# Patient Record
Sex: MALE | Race: BLACK OR AFRICAN AMERICAN | NOT HISPANIC OR LATINO | Employment: FULL TIME | URBAN - METROPOLITAN AREA
[De-identification: names, ages, dates, MRNs, and addresses within clinical notes are randomized per-mention and may not be internally consistent; named-entity substitution may affect disease eponyms.]

---

## 2020-07-28 ENCOUNTER — OFFICE VISIT (OUTPATIENT)
Dept: FAMILY MEDICINE | Facility: CLINIC | Age: 40
End: 2020-07-28
Payer: COMMERCIAL

## 2020-07-28 ENCOUNTER — LAB VISIT (OUTPATIENT)
Dept: LAB | Facility: HOSPITAL | Age: 40
End: 2020-07-28
Payer: COMMERCIAL

## 2020-07-28 VITALS
TEMPERATURE: 98 F | WEIGHT: 235.44 LBS | HEIGHT: 74 IN | HEART RATE: 77 BPM | SYSTOLIC BLOOD PRESSURE: 124 MMHG | RESPIRATION RATE: 16 BRPM | BODY MASS INDEX: 30.21 KG/M2 | DIASTOLIC BLOOD PRESSURE: 80 MMHG | OXYGEN SATURATION: 96 %

## 2020-07-28 DIAGNOSIS — R53.83 FATIGUE, UNSPECIFIED TYPE: Primary | ICD-10-CM

## 2020-07-28 DIAGNOSIS — R53.83 FATIGUE, UNSPECIFIED TYPE: ICD-10-CM

## 2020-07-28 LAB
ALBUMIN SERPL BCP-MCNC: 4.2 G/DL (ref 3.5–5.2)
ALP SERPL-CCNC: 112 U/L (ref 55–135)
ALT SERPL W/O P-5'-P-CCNC: 51 U/L (ref 10–44)
ANION GAP SERPL CALC-SCNC: 9 MMOL/L (ref 8–16)
AST SERPL-CCNC: 35 U/L (ref 10–40)
BASOPHILS # BLD AUTO: 0.01 K/UL (ref 0–0.2)
BASOPHILS NFR BLD: 0.4 % (ref 0–1.9)
BILIRUB SERPL-MCNC: 0.4 MG/DL (ref 0.1–1)
BUN SERPL-MCNC: 14 MG/DL (ref 6–20)
CALCIUM SERPL-MCNC: 9.5 MG/DL (ref 8.7–10.5)
CHLORIDE SERPL-SCNC: 106 MMOL/L (ref 95–110)
CO2 SERPL-SCNC: 26 MMOL/L (ref 23–29)
CREAT SERPL-MCNC: 1.2 MG/DL (ref 0.5–1.4)
DIFFERENTIAL METHOD: ABNORMAL
EOSINOPHIL # BLD AUTO: 0 K/UL (ref 0–0.5)
EOSINOPHIL NFR BLD: 0.4 % (ref 0–8)
ERYTHROCYTE [DISTWIDTH] IN BLOOD BY AUTOMATED COUNT: 13.2 % (ref 11.5–14.5)
EST. GFR  (AFRICAN AMERICAN): >60 ML/MIN/1.73 M^2
EST. GFR  (NON AFRICAN AMERICAN): >60 ML/MIN/1.73 M^2
GLUCOSE SERPL-MCNC: 93 MG/DL (ref 70–110)
HCT VFR BLD AUTO: 50.7 % (ref 40–54)
HGB BLD-MCNC: 16 G/DL (ref 14–18)
IMM GRANULOCYTES # BLD AUTO: 0.01 K/UL (ref 0–0.04)
IMM GRANULOCYTES NFR BLD AUTO: 0.4 % (ref 0–0.5)
LYMPHOCYTES # BLD AUTO: 1 K/UL (ref 1–4.8)
LYMPHOCYTES NFR BLD: 37.4 % (ref 18–48)
MCH RBC QN AUTO: 30.4 PG (ref 27–31)
MCHC RBC AUTO-ENTMCNC: 31.6 G/DL (ref 32–36)
MCV RBC AUTO: 96 FL (ref 82–98)
MONOCYTES # BLD AUTO: 0.3 K/UL (ref 0.3–1)
MONOCYTES NFR BLD: 10.4 % (ref 4–15)
NEUTROPHILS # BLD AUTO: 1.4 K/UL (ref 1.8–7.7)
NEUTROPHILS NFR BLD: 51 % (ref 38–73)
NRBC BLD-RTO: 0 /100 WBC
PLATELET # BLD AUTO: 232 K/UL (ref 150–350)
PMV BLD AUTO: 11.6 FL (ref 9.2–12.9)
POTASSIUM SERPL-SCNC: 4.1 MMOL/L (ref 3.5–5.1)
PROT SERPL-MCNC: 7.8 G/DL (ref 6–8.4)
RBC # BLD AUTO: 5.26 M/UL (ref 4.6–6.2)
SARS-COV-2 IGG SERPLBLD QL IA.RAPID: NEGATIVE
SODIUM SERPL-SCNC: 141 MMOL/L (ref 136–145)
TSH SERPL DL<=0.005 MIU/L-ACNC: 1.16 UIU/ML (ref 0.4–4)
WBC # BLD AUTO: 2.78 K/UL (ref 3.9–12.7)

## 2020-07-28 PROCEDURE — 80053 COMPREHEN METABOLIC PANEL: CPT

## 2020-07-28 PROCEDURE — 99999 PR PBB SHADOW E&M-NEW PATIENT-LVL III: CPT | Mod: PBBFAC,,, | Performed by: FAMILY MEDICINE

## 2020-07-28 PROCEDURE — 99203 OFFICE O/P NEW LOW 30 MIN: CPT | Mod: S$GLB,,, | Performed by: FAMILY MEDICINE

## 2020-07-28 PROCEDURE — 36415 COLL VENOUS BLD VENIPUNCTURE: CPT | Mod: PO

## 2020-07-28 PROCEDURE — 99203 PR OFFICE/OUTPT VISIT, NEW, LEVL III, 30-44 MIN: ICD-10-PCS | Mod: S$GLB,,, | Performed by: FAMILY MEDICINE

## 2020-07-28 PROCEDURE — 86769 SARS-COV-2 COVID-19 ANTIBODY: CPT

## 2020-07-28 PROCEDURE — 85025 COMPLETE CBC W/AUTO DIFF WBC: CPT

## 2020-07-28 PROCEDURE — 84443 ASSAY THYROID STIM HORMONE: CPT

## 2020-07-28 PROCEDURE — 99999 PR PBB SHADOW E&M-NEW PATIENT-LVL III: ICD-10-PCS | Mod: PBBFAC,,, | Performed by: FAMILY MEDICINE

## 2020-07-28 NOTE — PROGRESS NOTES
Foster Hickman    Chief Complaint   Patient presents with    Establish Care       History of Present Illness:   Mr. Hickman comes in today as a new patient to establish care with me.  He is concerned as has not been feeling the past 2 weeks he states on July 17, 2020 he experienced palpitations which he confirmed with his fitness tracker.  On July 18, 2020 he states he awakens with fever and body aches.  He states he evaluated [facility] and had rapid COVID-19 test and chest x-ray, both with negative results.  As he felt rapid COVID-19 testing was not as accurate, he states he was checked with none rapid COVID-19 test performed at [facility] testing site; he states his result was negative.  He also reports initially having decreased appetite.    He states his body aches ceased by July 25, 2020.  He states he no longer has fever.  He states his appetite is a little better.  He reports having rare chills and hot intolerance with fatigue.  He states he does not feel mentally as sharp.    Otherwise, he denies having shortness of breath, cough, wheezing; sinus or ocular symptoms; chest pain, leg swelling; abdominal pain, nausea, vomiting, diarrhea, constipation; cold intolerance; unusual urinary symptoms; back pain; headaches; anxiety, depression, homicidal or suicidal thoughts.    He denies tobacco, alcohol, or illicit drug use.  He states no other family members have similar symptoms.  He states he has not been to work since July 17, 2020 due to job-sanctioned mandatory 14-day quarantine (as tested for COVID-19) and is scheduled to return on July 29, 2020.    Overall, he states he now feels 70% better.      History reviewed. No pertinent past medical history.      No current outpatient medications on file.         Review of Systems   Constitutional: Positive for appetite change, chills and fatigue. Negative for fever.   HENT: Negative for congestion, postnasal drip, rhinorrhea, sinus  pressure, sinus pain, sneezing and sore throat.    Eyes: Negative for pain, discharge, redness and itching.   Respiratory: Negative for cough, shortness of breath and wheezing.    Cardiovascular: Negative for chest pain, palpitations and leg swelling.   Gastrointestinal: Negative for abdominal pain, constipation, diarrhea and nausea.   Endocrine: Positive for heat intolerance. Negative for cold intolerance.   Genitourinary: Negative for difficulty urinating.   Musculoskeletal: Positive for myalgias. Negative for back pain.   Neurological: Negative for headaches.   Psychiatric/Behavioral: Negative for dysphoric mood and suicidal ideas. The patient is not nervous/anxious.         Negative for homicidal ideas.       Objective:  Physical Exam  Vitals signs reviewed.   Constitutional:       General: He is not in acute distress.     Appearance: Normal appearance. He is not ill-appearing.      Comments: Pleasant.   HENT:      Head: Normocephalic and atraumatic.      Right Ear: Tympanic membrane, ear canal and external ear normal. There is no impacted cerumen.      Left Ear: Tympanic membrane, ear canal and external ear normal. There is no impacted cerumen.      Nose: Congestion present. No rhinorrhea.      Comments: Non tender sinuses.     Mouth/Throat:      Mouth: Mucous membranes are moist.      Pharynx: Oropharynx is clear. No oropharyngeal exudate or posterior oropharyngeal erythema.   Eyes:      General:         Right eye: No discharge.         Left eye: No discharge.      Extraocular Movements: Extraocular movements intact.      Conjunctiva/sclera: Conjunctivae normal.      Pupils: Pupils are equal, round, and reactive to light.   Neck:      Musculoskeletal: Normal range of motion and neck supple. No muscular tenderness.   Cardiovascular:      Rate and Rhythm: Normal rate and regular rhythm.      Pulses: Normal pulses.      Heart sounds: No murmur.   Pulmonary:      Effort: Pulmonary effort is normal. No respiratory  distress.      Breath sounds: Normal breath sounds. No wheezing.   Abdominal:      General: Bowel sounds are normal. There is no distension.      Palpations: Abdomen is soft. There is no mass.      Tenderness: There is no abdominal tenderness. There is no guarding or rebound.   Musculoskeletal: Normal range of motion.         General: No swelling or tenderness.      Comments: She is ambulatory without problems.   Skin:     Findings: No rash.   Neurological:      General: No focal deficit present.      Mental Status: He is alert and oriented to person, place, and time.   Psychiatric:         Mood and Affect: Mood normal.         Behavior: Behavior normal.         Thought Content: Thought content normal.         Judgment: Judgment normal.         ASSESSMENT:  1. Fatigue, unspecified type        PLAN:  Foster was seen today for establish care.    Diagnoses and all orders for this visit:    Fatigue, unspecified type  -     CBC auto differential; Future  -     TSH; Future  -     Comprehensive metabolic panel; Future  -     COVID-19 (SARS CoV-2) IgG Antibody; Future    Patient advised to call for results.  Continue current medications (including take OTC decongestant if needed for nasal congestion), follow low sodium, low cholesterol, low carb diet, daily walks.  Rest, hydration.  Reassurance as improving.  Follow up if symptoms worsen or fail to improve.

## 2020-07-29 DIAGNOSIS — D72.819 LEUKOPENIA, UNSPECIFIED TYPE: Primary | ICD-10-CM

## 2020-07-29 DIAGNOSIS — R74.01 ELEVATED ALT MEASUREMENT: ICD-10-CM

## 2021-03-23 ENCOUNTER — PATIENT MESSAGE (OUTPATIENT)
Dept: FAMILY MEDICINE | Facility: CLINIC | Age: 41
End: 2021-03-23

## 2021-03-25 ENCOUNTER — OFFICE VISIT (OUTPATIENT)
Dept: FAMILY MEDICINE | Facility: CLINIC | Age: 41
End: 2021-03-25
Payer: COMMERCIAL

## 2021-03-25 DIAGNOSIS — G43.909 MIGRAINE WITHOUT STATUS MIGRAINOSUS, NOT INTRACTABLE, UNSPECIFIED MIGRAINE TYPE: Primary | ICD-10-CM

## 2021-03-25 PROCEDURE — 99214 PR OFFICE/OUTPT VISIT, EST, LEVL IV, 30-39 MIN: ICD-10-PCS | Mod: 95,,, | Performed by: FAMILY MEDICINE

## 2021-03-25 PROCEDURE — 99214 OFFICE O/P EST MOD 30 MIN: CPT | Mod: 95,,, | Performed by: FAMILY MEDICINE

## 2021-03-25 RX ORDER — ELETRIPTAN HYDROBROMIDE 40 MG/1
40 TABLET, FILM COATED ORAL
Qty: 12 TABLET | Refills: 2 | Status: SHIPPED | OUTPATIENT
Start: 2021-03-25 | End: 2021-10-29

## 2021-03-30 ENCOUNTER — PATIENT MESSAGE (OUTPATIENT)
Dept: FAMILY MEDICINE | Facility: CLINIC | Age: 41
End: 2021-03-30

## 2021-04-01 ENCOUNTER — TELEPHONE (OUTPATIENT)
Dept: FAMILY MEDICINE | Facility: CLINIC | Age: 41
End: 2021-04-01

## 2021-10-11 DIAGNOSIS — Z00.00 ROUTINE GENERAL MEDICAL EXAMINATION AT A HEALTH CARE FACILITY: Primary | ICD-10-CM

## 2021-10-29 ENCOUNTER — CLINICAL SUPPORT (OUTPATIENT)
Dept: REHABILITATION | Facility: HOSPITAL | Age: 41
End: 2021-10-29
Attending: INTERNAL MEDICINE

## 2021-10-29 ENCOUNTER — HOSPITAL ENCOUNTER (OUTPATIENT)
Dept: RADIOLOGY | Facility: HOSPITAL | Age: 41
Discharge: HOME | End: 2021-10-29
Attending: INTERNAL MEDICINE

## 2021-10-29 ENCOUNTER — OFFICE VISIT (OUTPATIENT)
Dept: INTERNAL MEDICINE | Facility: CLINIC | Age: 41
End: 2021-10-29

## 2021-10-29 ENCOUNTER — CLINICAL SUPPORT (OUTPATIENT)
Dept: INTERNAL MEDICINE | Facility: CLINIC | Age: 41
End: 2021-10-29

## 2021-10-29 ENCOUNTER — CLINICAL SUPPORT (OUTPATIENT)
Dept: INTERNAL MEDICINE | Facility: CLINIC | Age: 41
End: 2021-10-29
Payer: COMMERCIAL

## 2021-10-29 ENCOUNTER — HOSPITAL ENCOUNTER (OUTPATIENT)
Dept: CARDIOLOGY | Facility: HOSPITAL | Age: 41
Discharge: HOME | End: 2021-10-29
Attending: INTERNAL MEDICINE

## 2021-10-29 VITALS
WEIGHT: 240.31 LBS | RESPIRATION RATE: 17 BRPM | BODY MASS INDEX: 30.84 KG/M2 | DIASTOLIC BLOOD PRESSURE: 83 MMHG | SYSTOLIC BLOOD PRESSURE: 129 MMHG | TEMPERATURE: 98 F | HEIGHT: 74 IN | OXYGEN SATURATION: 97 % | HEART RATE: 64 BPM

## 2021-10-29 DIAGNOSIS — Z00.00 ROUTINE GENERAL MEDICAL EXAMINATION AT A HEALTH CARE FACILITY: ICD-10-CM

## 2021-10-29 DIAGNOSIS — Z00.00 ROUTINE GENERAL MEDICAL EXAMINATION AT A HEALTH CARE FACILITY: Primary | ICD-10-CM

## 2021-10-29 DIAGNOSIS — G43.109 MIGRAINE WITH AURA AND WITHOUT STATUS MIGRAINOSUS, NOT INTRACTABLE: ICD-10-CM

## 2021-10-29 DIAGNOSIS — G43.909 MIGRAINE WITHOUT STATUS MIGRAINOSUS, NOT INTRACTABLE, UNSPECIFIED MIGRAINE TYPE: ICD-10-CM

## 2021-10-29 DIAGNOSIS — Z00.00 ENCOUNTER FOR PREVENTIVE HEALTH EXAMINATION: Primary | ICD-10-CM

## 2021-10-29 LAB
ALBUMIN SERPL BCP-MCNC: 4.2 G/DL (ref 3.5–5.2)
ALP SERPL-CCNC: 113 U/L (ref 55–135)
ALT SERPL W/O P-5'-P-CCNC: 48 U/L (ref 10–44)
ANION GAP SERPL CALC-SCNC: 9 MMOL/L (ref 8–16)
AST SERPL-CCNC: 30 U/L (ref 10–40)
BILIRUB SERPL-MCNC: 0.5 MG/DL (ref 0.1–1)
BUN SERPL-MCNC: 17 MG/DL (ref 6–20)
CALCIUM SERPL-MCNC: 11.4 MG/DL (ref 8.7–10.5)
CHLORIDE SERPL-SCNC: 109 MMOL/L (ref 95–110)
CHOLEST SERPL-MCNC: 275 MG/DL (ref 120–199)
CHOLEST/HDLC SERPL: 6.9 {RATIO} (ref 2–5)
CO2 SERPL-SCNC: 23 MMOL/L (ref 23–29)
COMPLEXED PSA SERPL-MCNC: 0.56 NG/ML (ref 0–4)
CREAT SERPL-MCNC: 1.1 MG/DL (ref 0.5–1.4)
CV STRESS BASE HR: 57 BPM
DIASTOLIC BLOOD PRESSURE: 100 MMHG
ERYTHROCYTE [DISTWIDTH] IN BLOOD BY AUTOMATED COUNT: 12.6 % (ref 11.5–14.5)
EST. GFR  (AFRICAN AMERICAN): >60 ML/MIN/1.73 M^2
EST. GFR  (NON AFRICAN AMERICAN): >60 ML/MIN/1.73 M^2
ESTIMATED AVG GLUCOSE: 114 MG/DL (ref 68–131)
GLUCOSE SERPL-MCNC: 96 MG/DL (ref 70–110)
HBA1C MFR BLD: 5.6 % (ref 4–5.6)
HCT VFR BLD AUTO: 46.1 % (ref 40–54)
HDLC SERPL-MCNC: 40 MG/DL (ref 40–75)
HDLC SERPL: 14.5 % (ref 20–50)
HGB BLD-MCNC: 16 G/DL (ref 14–18)
LDLC SERPL CALC-MCNC: 203.8 MG/DL (ref 63–159)
MCH RBC QN AUTO: 31.1 PG (ref 27–31)
MCHC RBC AUTO-ENTMCNC: 34.7 G/DL (ref 32–36)
MCV RBC AUTO: 90 FL (ref 82–98)
NONHDLC SERPL-MCNC: 235 MG/DL
OHS CV CPX 1 MINUTE RECOVERY HEART RATE: 101 BPM
OHS CV CPX 85 PERCENT MAX PREDICTED HEART RATE MALE: 152
OHS CV CPX ESTIMATED METS: 11
OHS CV CPX MAX PREDICTED HEART RATE: 179
OHS CV CPX PATIENT IS FEMALE: 0
OHS CV CPX PATIENT IS MALE: 1
OHS CV CPX PEAK DIASTOLIC BLOOD PRESSURE: 98 MMHG
OHS CV CPX PEAK HEAR RATE: 144 BPM
OHS CV CPX PEAK RATE PRESSURE PRODUCT: NORMAL
OHS CV CPX PEAK SYSTOLIC BLOOD PRESSURE: 230 MMHG
OHS CV CPX PERCENT MAX PREDICTED HEART RATE ACHIEVED: 80
OHS CV CPX RATE PRESSURE PRODUCT PRESENTING: 7581
PLATELET # BLD AUTO: 308 K/UL (ref 150–450)
PMV BLD AUTO: 10.2 FL (ref 9.2–12.9)
POTASSIUM SERPL-SCNC: 4.2 MMOL/L (ref 3.5–5.1)
PROT SERPL-MCNC: 7.8 G/DL (ref 6–8.4)
RBC # BLD AUTO: 5.15 M/UL (ref 4.6–6.2)
SODIUM SERPL-SCNC: 141 MMOL/L (ref 136–145)
STRESS ECHO POST EXERCISE DUR MIN: 9 MINUTES
STRESS ECHO POST EXERCISE DUR SEC: 0 SECONDS
SYSTOLIC BLOOD PRESSURE: 133 MMHG
TRIGL SERPL-MCNC: 156 MG/DL (ref 30–150)
TSH SERPL DL<=0.005 MIU/L-ACNC: 1.11 UIU/ML (ref 0.4–4)
WBC # BLD AUTO: 3.88 K/UL (ref 3.9–12.7)

## 2021-10-29 PROCEDURE — 90686 IIV4 VACC NO PRSV 0.5 ML IM: CPT | Mod: S$GLB,,, | Performed by: INTERNAL MEDICINE

## 2021-10-29 PROCEDURE — 90715 TDAP VACCINE GREATER THAN OR EQUAL TO 7YO IM: ICD-10-PCS | Mod: S$GLB,,, | Performed by: INTERNAL MEDICINE

## 2021-10-29 PROCEDURE — 71046 X-RAY EXAM CHEST 2 VIEWS: CPT | Mod: 26,,, | Performed by: RADIOLOGY

## 2021-10-29 PROCEDURE — 90472 IMMUNIZATION ADMIN EACH ADD: CPT | Mod: S$GLB,,, | Performed by: INTERNAL MEDICINE

## 2021-10-29 PROCEDURE — 90715 TDAP VACCINE 7 YRS/> IM: CPT | Mod: S$GLB,,, | Performed by: INTERNAL MEDICINE

## 2021-10-29 PROCEDURE — 84443 ASSAY THYROID STIM HORMONE: CPT | Performed by: INTERNAL MEDICINE

## 2021-10-29 PROCEDURE — 90686 FLU VACCINE (QUAD) GREATER THAN OR EQUAL TO 3YO PRESERVATIVE FREE IM: ICD-10-PCS | Mod: S$GLB,,, | Performed by: INTERNAL MEDICINE

## 2021-10-29 PROCEDURE — 90472 TDAP VACCINE GREATER THAN OR EQUAL TO 7YO IM: ICD-10-PCS | Mod: S$GLB,,, | Performed by: INTERNAL MEDICINE

## 2021-10-29 PROCEDURE — 83036 HEMOGLOBIN GLYCOSYLATED A1C: CPT | Performed by: INTERNAL MEDICINE

## 2021-10-29 PROCEDURE — 99385 PREV VISIT NEW AGE 18-39: CPT | Mod: 25,S$GLB,, | Performed by: INTERNAL MEDICINE

## 2021-10-29 PROCEDURE — 93016 CV STRESS TEST SUPVJ ONLY: CPT | Mod: ,,, | Performed by: INTERNAL MEDICINE

## 2021-10-29 PROCEDURE — 99385 PR PREVENTIVE VISIT,NEW,18-39: ICD-10-PCS | Mod: 25,S$GLB,, | Performed by: INTERNAL MEDICINE

## 2021-10-29 PROCEDURE — 97802 MEDICAL NUTRITION INDIV IN: CPT | Mod: S$GLB,,, | Performed by: INTERNAL MEDICINE

## 2021-10-29 PROCEDURE — 86803 HEPATITIS C AB TEST: CPT | Performed by: INTERNAL MEDICINE

## 2021-10-29 PROCEDURE — 90471 IMMUNIZATION ADMIN: CPT | Mod: S$GLB,,, | Performed by: INTERNAL MEDICINE

## 2021-10-29 PROCEDURE — 80061 LIPID PANEL: CPT | Performed by: INTERNAL MEDICINE

## 2021-10-29 PROCEDURE — 90471 FLU VACCINE (QUAD) GREATER THAN OR EQUAL TO 3YO PRESERVATIVE FREE IM: ICD-10-PCS | Mod: S$GLB,,, | Performed by: INTERNAL MEDICINE

## 2021-10-29 PROCEDURE — 99211 PR NURSE VISIT, 15 MINS, EXEC HLTH ONLY: ICD-10-PCS | Mod: S$GLB,,, | Performed by: INTERNAL MEDICINE

## 2021-10-29 PROCEDURE — 99211 OFF/OP EST MAY X REQ PHY/QHP: CPT | Mod: S$GLB,,, | Performed by: INTERNAL MEDICINE

## 2021-10-29 PROCEDURE — 87389 HIV-1 AG W/HIV-1&-2 AB AG IA: CPT | Performed by: INTERNAL MEDICINE

## 2021-10-29 PROCEDURE — 93018 EXERCISE STRESS - EKG (CUPID ONLY): ICD-10-PCS | Mod: ,,, | Performed by: INTERNAL MEDICINE

## 2021-10-29 PROCEDURE — 97802 PR MED NUTR THER, 1ST, INDIV, EA 15 MIN: ICD-10-PCS | Mod: S$GLB,,, | Performed by: INTERNAL MEDICINE

## 2021-10-29 PROCEDURE — 85027 COMPLETE CBC AUTOMATED: CPT | Performed by: INTERNAL MEDICINE

## 2021-10-29 PROCEDURE — 99999 PR PBB SHADOW E&M-EST. PATIENT-LVL IV: CPT | Mod: PBBFAC,,, | Performed by: INTERNAL MEDICINE

## 2021-10-29 PROCEDURE — 71046 XR CHEST PA AND LATERAL: ICD-10-PCS | Mod: 26,,, | Performed by: RADIOLOGY

## 2021-10-29 PROCEDURE — 93018 CV STRESS TEST I&R ONLY: CPT | Mod: ,,, | Performed by: INTERNAL MEDICINE

## 2021-10-29 PROCEDURE — 71046 X-RAY EXAM CHEST 2 VIEWS: CPT | Mod: TC

## 2021-10-29 PROCEDURE — 97750 PHYSICAL PERFORMANCE TEST: CPT | Performed by: PHYSICAL THERAPIST

## 2021-10-29 PROCEDURE — 84153 ASSAY OF PSA TOTAL: CPT | Performed by: INTERNAL MEDICINE

## 2021-10-29 PROCEDURE — 93017 CV STRESS TEST TRACING ONLY: CPT

## 2021-10-29 PROCEDURE — 80053 COMPREHEN METABOLIC PANEL: CPT | Performed by: INTERNAL MEDICINE

## 2021-10-29 PROCEDURE — 99999 PR PBB SHADOW E&M-EST. PATIENT-LVL IV: ICD-10-PCS | Mod: PBBFAC,,, | Performed by: INTERNAL MEDICINE

## 2021-10-29 PROCEDURE — 93016 EXERCISE STRESS - EKG (CUPID ONLY): ICD-10-PCS | Mod: ,,, | Performed by: INTERNAL MEDICINE

## 2021-10-29 RX ORDER — ELETRIPTAN HYDROBROMIDE 40 MG/1
40 TABLET, FILM COATED ORAL
Qty: 12 TABLET | Refills: 2 | Status: SHIPPED | OUTPATIENT
Start: 2021-10-29 | End: 2022-07-21 | Stop reason: SDUPTHER

## 2021-11-01 LAB
HCV AB SERPL QL IA: NEGATIVE
HIV 1+2 AB+HIV1 P24 AG SERPL QL IA: NEGATIVE

## 2022-07-21 DIAGNOSIS — G43.109 MIGRAINE WITH AURA AND WITHOUT STATUS MIGRAINOSUS, NOT INTRACTABLE: ICD-10-CM

## 2022-07-21 RX ORDER — ELETRIPTAN HYDROBROMIDE 40 MG/1
40 TABLET, FILM COATED ORAL
Qty: 12 TABLET | Refills: 2 | Status: SHIPPED | OUTPATIENT
Start: 2022-07-21 | End: 2023-05-03 | Stop reason: SDUPTHER

## 2022-07-25 ENCOUNTER — TELEPHONE (OUTPATIENT)
Dept: FAMILY MEDICINE | Facility: CLINIC | Age: 42
End: 2022-07-25
Payer: COMMERCIAL

## 2022-07-25 ENCOUNTER — PATIENT MESSAGE (OUTPATIENT)
Dept: FAMILY MEDICINE | Facility: CLINIC | Age: 42
End: 2022-07-25
Payer: COMMERCIAL

## 2022-07-25 NOTE — TELEPHONE ENCOUNTER
"Pt portal message    "Good morning Dr. Moore I was wondering if you and your team would be able to complete this paperwork documenting my history if migraines for my job. It's the same exact form that was filled out in March 2021. If I need to schedule an appointment please let me know. Thank you so much."     Forms at desk. Please advise  "

## 2022-07-25 NOTE — TELEPHONE ENCOUNTER
Future appt:     Your appointments     Date & Time Appointment Department [facility])    Sep 03, 2019  1:15 PM CDT Laboratory Visit with [facility] ([facility])        Nov 05, 2019  2:30 PM CST Follow up with Dana Armstrong Pt has been scheduled and notified thru mychart

## 2022-07-28 ENCOUNTER — OFFICE VISIT (OUTPATIENT)
Dept: FAMILY MEDICINE | Facility: CLINIC | Age: 42
End: 2022-07-28
Payer: COMMERCIAL

## 2022-07-28 VITALS
HEART RATE: 71 BPM | SYSTOLIC BLOOD PRESSURE: 122 MMHG | HEIGHT: 74 IN | TEMPERATURE: 98 F | BODY MASS INDEX: 31.12 KG/M2 | DIASTOLIC BLOOD PRESSURE: 70 MMHG | OXYGEN SATURATION: 98 % | WEIGHT: 242.5 LBS

## 2022-07-28 DIAGNOSIS — E66.9 OBESITY (BMI 30.0-34.9): ICD-10-CM

## 2022-07-28 DIAGNOSIS — G43.109 MIGRAINE WITH AURA AND WITHOUT STATUS MIGRAINOSUS, NOT INTRACTABLE: Primary | ICD-10-CM

## 2022-07-28 PROBLEM — E66.811 OBESITY (BMI 30.0-34.9): Status: ACTIVE | Noted: 2022-07-28

## 2022-07-28 PROCEDURE — 99999 PR PBB SHADOW E&M-EST. PATIENT-LVL IV: CPT | Mod: PBBFAC,,, | Performed by: FAMILY MEDICINE

## 2022-07-28 PROCEDURE — 99213 OFFICE O/P EST LOW 20 MIN: CPT | Mod: S$GLB,,, | Performed by: FAMILY MEDICINE

## 2022-07-28 PROCEDURE — 99213 PR OFFICE/OUTPT VISIT, EST, LEVL III, 20-29 MIN: ICD-10-PCS | Mod: S$GLB,,, | Performed by: FAMILY MEDICINE

## 2022-07-28 PROCEDURE — 99999 PR PBB SHADOW E&M-EST. PATIENT-LVL IV: ICD-10-PCS | Mod: PBBFAC,,, | Performed by: FAMILY MEDICINE

## 2022-07-28 NOTE — PROGRESS NOTES
[facility]    Chief Complaint   Patient presents with    Headache       History of Present Illness:   Mr. Hickman comes in today for migraine follow-up and work form completion.  He states he now has migraine headaches approximately every 2 months and takes medications with help.  He actually states he has not had a migraine headache in some time until Monday, July 25, 2022.  He states he missed work on Monday and Tuesday of this week due to migraine headaches; otherwise, he states he has not missed work due to migraine since December 2022.    Otherwise, he denies having fever, chills, fatigue, appetite changes; shortness of breath, cough, wheezing; chest pain, palpitations, leg swelling; abdominal pain, nausea, vomiting, diarrhea, constipation; unusual urinary symptoms; polydipsia, polyphagia, polyuria, hot or cold intolerance; back pain; anxiety, depression, homicidal or suicidal thoughts.               Current Outpatient Medications   Medication Sig    eletriptan (RELPAX) 40 MG tablet Take 1 tablet (40 mg total) by mouth as needed (migraine headache). may repeat in 2 hours if necessary; max of 2 pills per day     Review of Systems   Constitutional:  Negative for appetite change, chills, fatigue and fever.   Respiratory:  Negative for cough, shortness of breath and wheezing.    Cardiovascular:  Negative for chest pain, palpitations and leg swelling.   Gastrointestinal:  Negative for constipation, diarrhea, nausea and vomiting.   Endocrine: Negative for cold intolerance, heat intolerance, polydipsia, polyphagia and polyuria.   Genitourinary:  Negative for difficulty urinating.   Musculoskeletal:  Negative for arthralgias, back pain and myalgias.   Neurological:  Positive for headaches.        See history of present illness.   Psychiatric/Behavioral:  Negative for dysphoric mood and suicidal ideas. The patient is not nervous/anxious.         Negative for homicidal ideas.     Objective:  Physical Exam  Vitals  reviewed.   Constitutional:       General: He is not in acute distress.     Appearance: Normal appearance. He is obese. He is not ill-appearing, toxic-appearing or diaphoretic.      Comments: Pleasant.   HENT:      Head: Normocephalic and atraumatic.      Right Ear: Tympanic membrane, ear canal and external ear normal. There is no impacted cerumen.      Left Ear: Tympanic membrane, ear canal and external ear normal. There is no impacted cerumen.      Nose: Nose normal. No congestion or rhinorrhea.      Mouth/Throat:      Mouth: Mucous membranes are moist.      Pharynx: Oropharynx is clear. No oropharyngeal exudate or posterior oropharyngeal erythema.   Eyes:      General:         Right eye: No discharge.         Left eye: No discharge.      Extraocular Movements: Extraocular movements intact.      Conjunctiva/sclera: Conjunctivae normal.      Pupils: Pupils are equal, round, and reactive to light.   Cardiovascular:      Rate and Rhythm: Normal rate and regular rhythm.      Pulses: Normal pulses.      Heart sounds: No murmur heard.  Pulmonary:      Effort: Pulmonary effort is normal. No respiratory distress.      Breath sounds: Normal breath sounds. No wheezing.   Abdominal:      General: Bowel sounds are normal. There is no distension.      Palpations: Abdomen is soft. There is no mass.      Tenderness: There is no abdominal tenderness. There is no guarding or rebound.   Musculoskeletal:         General: No swelling or tenderness. Normal range of motion.      Cervical back: Normal range of motion and neck supple. No tenderness.      Comments: He is ambulatory without problems.   Lymphadenopathy:      Cervical: No cervical adenopathy.   Skin:     General: Skin is warm.   Neurological:      General: No focal deficit present.      Mental Status: He is alert and oriented to person, place, and time.   Psychiatric:         Mood and Affect: Mood normal.         Behavior: Behavior normal.         Thought Content: Thought  content normal.         Judgment: Judgment normal.       ASSESSMENT:  1. Migraine with aura and without status migrainosus, not intractable    2. Obesity (BMI 30.0-34.9)        PLAN:  Foster was seen today for headache.    Diagnoses and all orders for this visit:    Migraine with aura and without status migrainosus, not intractable    Obesity (BMI 30.0-34.9)      Continue current medications, follow low sodium, low cholesterol, low carb diet, daily walks.  FMLA form completed with original given to patient and copy noted on chart.  Flu shot this fall.  See me sooner if no improvement or worsening symptoms noted. But, Follow up in about 1 year (around 7/28/2023) for migraine follow up.

## 2022-08-02 NOTE — TELEPHONE ENCOUNTER
See copy at desk with changes and my signed initial; however, please quickly pull original copy from last week (if we still have it) so I can make changes with my signed initial to get better copy for patient.    Thanks.

## 2022-08-18 DIAGNOSIS — Z00.00 ROUTINE GENERAL MEDICAL EXAMINATION AT A HEALTH CARE FACILITY: Primary | ICD-10-CM

## 2022-08-30 ENCOUNTER — HOSPITAL ENCOUNTER (OUTPATIENT)
Dept: CARDIOLOGY | Facility: HOSPITAL | Age: 42
Discharge: HOME | End: 2022-08-30

## 2022-08-30 ENCOUNTER — CLINICAL SUPPORT (OUTPATIENT)
Dept: INTERNAL MEDICINE | Facility: CLINIC | Age: 42
End: 2022-08-30
Payer: COMMERCIAL

## 2022-08-30 ENCOUNTER — OFFICE VISIT (OUTPATIENT)
Dept: INTERNAL MEDICINE | Facility: CLINIC | Age: 42
End: 2022-08-30

## 2022-08-30 VITALS
DIASTOLIC BLOOD PRESSURE: 85 MMHG | BODY MASS INDEX: 30.56 KG/M2 | HEIGHT: 74 IN | WEIGHT: 238.13 LBS | SYSTOLIC BLOOD PRESSURE: 132 MMHG | RESPIRATION RATE: 16 BRPM | HEART RATE: 59 BPM

## 2022-08-30 DIAGNOSIS — R94.31 ABNORMAL EKG: ICD-10-CM

## 2022-08-30 DIAGNOSIS — Z00.00 ROUTINE GENERAL MEDICAL EXAMINATION AT A HEALTH CARE FACILITY: ICD-10-CM

## 2022-08-30 DIAGNOSIS — Z00.00 ROUTINE GENERAL MEDICAL EXAMINATION AT A HEALTH CARE FACILITY: Primary | ICD-10-CM

## 2022-08-30 DIAGNOSIS — E66.9 OBESITY (BMI 30.0-34.9): ICD-10-CM

## 2022-08-30 DIAGNOSIS — G89.29 CHRONIC PATELLOFEMORAL PAIN OF LEFT KNEE: ICD-10-CM

## 2022-08-30 DIAGNOSIS — M25.562 CHRONIC PATELLOFEMORAL PAIN OF LEFT KNEE: ICD-10-CM

## 2022-08-30 DIAGNOSIS — G43.109 MIGRAINE WITH AURA AND WITHOUT STATUS MIGRAINOSUS, NOT INTRACTABLE: ICD-10-CM

## 2022-08-30 DIAGNOSIS — R00.1 SINUS BRADYCARDIA BY ELECTROCARDIOGRAPHY: ICD-10-CM

## 2022-08-30 PROBLEM — R53.83 FATIGUE: Status: RESOLVED | Noted: 2020-07-28 | Resolved: 2022-08-30

## 2022-08-30 LAB
ALBUMIN SERPL BCP-MCNC: 4.2 G/DL (ref 3.5–5.2)
ALP SERPL-CCNC: 120 U/L (ref 55–135)
ALT SERPL W/O P-5'-P-CCNC: 37 U/L (ref 10–44)
ANION GAP SERPL CALC-SCNC: 8 MMOL/L (ref 8–16)
AST SERPL-CCNC: 30 U/L (ref 10–40)
BILIRUB SERPL-MCNC: 0.6 MG/DL (ref 0.1–1)
BUN SERPL-MCNC: 19 MG/DL (ref 6–20)
CALCIUM SERPL-MCNC: 11.2 MG/DL (ref 8.7–10.5)
CHLORIDE SERPL-SCNC: 111 MMOL/L (ref 95–110)
CHOLEST SERPL-MCNC: 246 MG/DL (ref 120–199)
CHOLEST/HDLC SERPL: 5.9 {RATIO} (ref 2–5)
CO2 SERPL-SCNC: 23 MMOL/L (ref 23–29)
COMPLEXED PSA SERPL-MCNC: 0.55 NG/ML (ref 0–4)
CREAT SERPL-MCNC: 1.2 MG/DL (ref 0.5–1.4)
ERYTHROCYTE [DISTWIDTH] IN BLOOD BY AUTOMATED COUNT: 12.8 % (ref 11.5–14.5)
EST. GFR  (NO RACE VARIABLE): >60 ML/MIN/1.73 M^2
ESTIMATED AVG GLUCOSE: 111 MG/DL (ref 68–131)
GLUCOSE SERPL-MCNC: 98 MG/DL (ref 70–110)
HBA1C MFR BLD: 5.5 % (ref 4–5.6)
HCT VFR BLD AUTO: 45.2 % (ref 40–54)
HDLC SERPL-MCNC: 42 MG/DL (ref 40–75)
HDLC SERPL: 17.1 % (ref 20–50)
HGB BLD-MCNC: 15.6 G/DL (ref 14–18)
LDLC SERPL CALC-MCNC: 188.2 MG/DL (ref 63–159)
MCH RBC QN AUTO: 31.3 PG (ref 27–31)
MCHC RBC AUTO-ENTMCNC: 34.5 G/DL (ref 32–36)
MCV RBC AUTO: 91 FL (ref 82–98)
NONHDLC SERPL-MCNC: 204 MG/DL
PLATELET # BLD AUTO: 288 K/UL (ref 150–450)
PMV BLD AUTO: 10.6 FL (ref 9.2–12.9)
POTASSIUM SERPL-SCNC: 4.7 MMOL/L (ref 3.5–5.1)
PROT SERPL-MCNC: 7.2 G/DL (ref 6–8.4)
RBC # BLD AUTO: 4.99 M/UL (ref 4.6–6.2)
SODIUM SERPL-SCNC: 142 MMOL/L (ref 136–145)
TRIGL SERPL-MCNC: 79 MG/DL (ref 30–150)
TSH SERPL DL<=0.005 MIU/L-ACNC: 1.19 UIU/ML (ref 0.4–4)
WBC # BLD AUTO: 2.8 K/UL (ref 3.9–12.7)

## 2022-08-30 PROCEDURE — 83036 HEMOGLOBIN GLYCOSYLATED A1C: CPT | Performed by: FAMILY MEDICINE

## 2022-08-30 PROCEDURE — 85027 COMPLETE CBC AUTOMATED: CPT | Performed by: FAMILY MEDICINE

## 2022-08-30 PROCEDURE — 93005 ELECTROCARDIOGRAM TRACING: CPT

## 2022-08-30 PROCEDURE — 99396 PR PREVENTIVE VISIT,EST,40-64: ICD-10-PCS | Mod: S$GLB,,, | Performed by: FAMILY MEDICINE

## 2022-08-30 PROCEDURE — 99999 PR PBB SHADOW E&M-EST. PATIENT-LVL IV: CPT | Mod: PBBFAC,,, | Performed by: FAMILY MEDICINE

## 2022-08-30 PROCEDURE — 84443 ASSAY THYROID STIM HORMONE: CPT | Performed by: FAMILY MEDICINE

## 2022-08-30 PROCEDURE — 99396 PREV VISIT EST AGE 40-64: CPT | Mod: S$GLB,,, | Performed by: FAMILY MEDICINE

## 2022-08-30 PROCEDURE — 84153 ASSAY OF PSA TOTAL: CPT | Performed by: FAMILY MEDICINE

## 2022-08-30 PROCEDURE — 80053 COMPREHEN METABOLIC PANEL: CPT | Performed by: FAMILY MEDICINE

## 2022-08-30 PROCEDURE — 80061 LIPID PANEL: CPT | Performed by: FAMILY MEDICINE

## 2022-08-30 PROCEDURE — 93010 EKG 12-LEAD: ICD-10-PCS | Mod: ,,, | Performed by: INTERNAL MEDICINE

## 2022-08-30 PROCEDURE — 99999 PR PBB SHADOW E&M-EST. PATIENT-LVL IV: ICD-10-PCS | Mod: PBBFAC,,, | Performed by: FAMILY MEDICINE

## 2022-08-30 PROCEDURE — 93010 ELECTROCARDIOGRAM REPORT: CPT | Mod: ,,, | Performed by: INTERNAL MEDICINE

## 2022-08-30 NOTE — PROGRESS NOTES
"Subjective:       Patient ID: Foster Hickman is a 42 y.o. male.    Chief Complaint: Executive Health    42-year-old male patient with Patient Active Problem List:     Migraine without status migrainosus, not intractable     Obesity (BMI 30.0-34.9)  Here for executive health physical.  Patient has been doing well, reports family history of heart disease and has been a runner, stays physically active.  Denies of any chest tightness or dizziness, shortness of breath.  Migraine headaches have been stable.  Has been having issues with left knee pain off and on for which patient had seen orthopedic physician and has been doing well    Review of Systems   Constitutional:  Negative for appetite change and fatigue.   Eyes:  Negative for visual disturbance.   Respiratory:  Negative for shortness of breath.    Cardiovascular:  Negative for chest pain, palpitations and leg swelling.   Gastrointestinal:  Negative for abdominal pain, nausea and vomiting.   Musculoskeletal:  Negative for arthralgias and myalgias.   Skin:  Negative for rash.   Neurological:  Negative for dizziness and headaches.   Psychiatric/Behavioral:  Negative for sleep disturbance.        /85   Pulse (!) 59   Resp 16   Ht 6' 2" (1.88 m)   Wt 108 kg (238 lb 1.6 oz)   BMI 30.57 kg/m²   Objective:      Physical Exam  Constitutional:       Appearance: He is well-developed.   HENT:      Head: Normocephalic and atraumatic.   Cardiovascular:      Rate and Rhythm: Normal rate and regular rhythm.      Heart sounds: Normal heart sounds. No murmur heard.  Pulmonary:      Effort: Pulmonary effort is normal.      Breath sounds: Normal breath sounds. No wheezing.   Abdominal:      General: Bowel sounds are normal.      Palpations: Abdomen is soft.      Tenderness: There is no abdominal tenderness.   Musculoskeletal:      Comments: Noted crepitus to the left knee   Skin:     General: Skin is warm and dry.      Findings: No rash.   Neurological:      Mental " Status: He is alert and oriented to person, place, and time.   Psychiatric:         Mood and Affect: Mood normal.         Assessment/Plan:   1. Routine general medical examination at a health care facility  Vital signs stable today.  Clinical exam stable  Continue lifestyle modifications with low-fat and low-cholesterol diet and exercise 30 minutes daily      2. Sinus bradycardia by electrocardiography  - Ambulatory referral/consult to Cardiology; Future  Patient clinically stable and asymptomatic.  With family history of heart disease referred to Cardiology for further evaluation    3. Migraine with aura and without status migrainosus, not intractable  Stable on Relpax as needed    4. Obesity (BMI 30.0-34.9)  Lifestyle modifications discussed to lose weight with BMI 30    5. Abnormal EKG  - Ambulatory referral/consult to Cardiology; Future    6. Chronic patellofemoral pain of left knee   Graded exercise regimen recommended and advised to use knee brace

## 2022-10-11 ENCOUNTER — OFFICE VISIT (OUTPATIENT)
Dept: CARDIOLOGY | Facility: CLINIC | Age: 42
End: 2022-10-11
Payer: COMMERCIAL

## 2022-10-11 VITALS
HEART RATE: 60 BPM | DIASTOLIC BLOOD PRESSURE: 92 MMHG | OXYGEN SATURATION: 99 % | BODY MASS INDEX: 30.79 KG/M2 | WEIGHT: 239.88 LBS | HEIGHT: 74 IN | SYSTOLIC BLOOD PRESSURE: 140 MMHG

## 2022-10-11 DIAGNOSIS — I10 PRIMARY HYPERTENSION: ICD-10-CM

## 2022-10-11 DIAGNOSIS — R00.1 SINUS BRADYCARDIA BY ELECTROCARDIOGRAPHY: ICD-10-CM

## 2022-10-11 DIAGNOSIS — R94.31 ABNORMAL EKG: Primary | ICD-10-CM

## 2022-10-11 DIAGNOSIS — E78.2 MIXED HYPERLIPIDEMIA: ICD-10-CM

## 2022-10-11 PROCEDURE — 99999 PR PBB SHADOW E&M-EST. PATIENT-LVL IV: ICD-10-PCS | Mod: PBBFAC,,, | Performed by: INTERNAL MEDICINE

## 2022-10-11 PROCEDURE — 99204 OFFICE O/P NEW MOD 45 MIN: CPT | Mod: S$GLB,,, | Performed by: INTERNAL MEDICINE

## 2022-10-11 PROCEDURE — 99204 PR OFFICE/OUTPT VISIT, NEW, LEVL IV, 45-59 MIN: ICD-10-PCS | Mod: S$GLB,,, | Performed by: INTERNAL MEDICINE

## 2022-10-11 PROCEDURE — 99999 PR PBB SHADOW E&M-EST. PATIENT-LVL IV: CPT | Mod: PBBFAC,,, | Performed by: INTERNAL MEDICINE

## 2022-10-11 NOTE — PROGRESS NOTES
Subjective:   Patient ID:  Foster Hickman is a 42 y.o. 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 Works at plant  Recent wellness exam, EKG NSR bradycardia 1st AVB and incomplete RBBB. Poor r progression in precordial leads.   No chest pain dyspnea dizziness faint and leg swelling.  Exercise 3 to 4 days a week. 2 to 3 hours a day  No smoking/drinking  No father had stroke at late 50's         Past Medical History:   Diagnosis Date    Migraine        Past Surgical History:   Procedure Laterality Date    ELBOW SURGERY Right     bone spur    VASECTOMY         Social History     Tobacco Use    Smoking status: Never    Smokeless tobacco: Never   Substance Use Topics    Alcohol use: No    Drug use: No       Family History   Problem Relation Age of Onset    Hypertension Mother     Hypertension Father     Heart disease Father     Diabetes Paternal Grandmother     No Known Problems Son     No Known Problems Son     No Known Problems Daughter     Cancer Neg Hx        Review of Systems   Constitutional: Negative for decreased appetite, diaphoresis, fever, malaise/fatigue and night sweats.   HENT:  Negative for nosebleeds.    Eyes:  Negative for blurred vision and double vision.   Cardiovascular:  Negative for chest pain, claudication, dyspnea on exertion, irregular heartbeat, leg swelling, near-syncope, orthopnea, palpitations, paroxysmal nocturnal dyspnea and syncope.   Respiratory:  Negative for cough, shortness of breath, sleep disturbances due to breathing, snoring, sputum production and wheezing.    Endocrine: Negative for cold intolerance and polyuria.   Hematologic/Lymphatic: Does not bruise/bleed easily.   Skin:  Negative for rash.   Musculoskeletal:  Negative for back pain, falls, joint pain, joint swelling and neck pain.   Gastrointestinal:  Negative for abdominal pain, heartburn, nausea and vomiting.   Genitourinary:  Negative for dysuria, frequency and hematuria.    Neurological:  Negative for difficulty with concentration, dizziness, focal weakness, headaches, light-headedness, numbness, seizures and weakness.   Psychiatric/Behavioral:  Negative for depression, memory loss and substance abuse. The patient does not have insomnia.    Allergic/Immunologic: Negative for HIV exposure and hives.     Objective:   Physical Exam  HENT:      Head: Normocephalic.   Eyes:      Pupils: Pupils are equal, round, and reactive to light.   Neck:      Thyroid: No thyromegaly.      Vascular: Normal carotid pulses. No carotid bruit or JVD.   Cardiovascular:      Rate and Rhythm: Normal rate and regular rhythm. No extrasystoles are present.     Chest Wall: PMI is not displaced.      Pulses: Normal pulses.      Heart sounds: Normal heart sounds. No murmur heard.    No gallop. No S3 sounds.   Pulmonary:      Effort: No respiratory distress.      Breath sounds: Normal breath sounds. No stridor.   Abdominal:      General: Bowel sounds are normal.      Palpations: Abdomen is soft.      Tenderness: There is no abdominal tenderness. There is no rebound.   Musculoskeletal:         General: Normal range of motion.   Skin:     Findings: No rash.   Neurological:      Mental Status: He is alert and oriented to person, place, and time.   Psychiatric:         Behavior: Behavior normal.       Lab Results   Component Value Date    CHOL 246 (H) 08/30/2022    CHOL 275 (H) 10/29/2021     Lab Results   Component Value Date    HDL 42 08/30/2022    HDL 40 10/29/2021     Lab Results   Component Value Date    LDLCALC 188.2 (H) 08/30/2022    LDLCALC 203.8 (H) 10/29/2021     Lab Results   Component Value Date    TRIG 79 08/30/2022    TRIG 156 (H) 10/29/2021     Lab Results   Component Value Date    CHOLHDL 17.1 (L) 08/30/2022    CHOLHDL 14.5 (L) 10/29/2021       Chemistry        Component Value Date/Time     08/30/2022 0842    K 4.7 08/30/2022 0842     (H) 08/30/2022 0842    CO2 23 08/30/2022 0842    BUN 19  08/30/2022 0842    CREATININE 1.2 08/30/2022 0842    GLU 98 08/30/2022 0842        Component Value Date/Time    CALCIUM 11.2 (H) 08/30/2022 0842    ALKPHOS 120 08/30/2022 0842    AST 30 08/30/2022 0842    ALT 37 08/30/2022 0842    BILITOT 0.6 08/30/2022 0842    ESTGFRAFRICA >60 10/29/2021 0731    EGFRNONAA >60 10/29/2021 0731          Lab Results   Component Value Date    HGBA1C 5.5 08/30/2022     Lab Results   Component Value Date    TSH 1.191 08/30/2022     No results found for: INR, PROTIME  Lab Results   Component Value Date    WBC 2.80 (L) 08/30/2022    HGB 15.6 08/30/2022    HCT 45.2 08/30/2022    MCV 91 08/30/2022     08/30/2022     BNP  @LABRCNTIP(BNP,BNPTRIAGEBLO)@  CrCl cannot be calculated (Patient's most recent lab result is older than the maximum 7 days allowed.).  No results found in the last 24 hours.  No results found in the last 24 hours.  No results found in the last 24 hours.    Assessment:      1. Abnormal EKG    2. Sinus bradycardia by electrocardiography    3. Primary hypertension    4. Mixed hyperlipidemia        ASCVD 10yrs risk 4.5%  Borderline HTN  Abnormal EKG    Plan:   Echo for abnormal EKG  DASH for HTN  Diet fat control for HLD    Counseled DASH  Check Lipid profile in 6 months  Recommend heart-healthy diet, weight control and regular exercise.  Baylee. Risk modification.   I have reviewed all pertinent labs and cardiac studies independently. Plans and recommendations have been formulated under my direct supervision. All questions answered and patient voiced understanding.   If symptoms persist go to the ED  F/u with PCP

## 2022-10-24 ENCOUNTER — TELEPHONE (OUTPATIENT)
Dept: CARDIOLOGY | Facility: CLINIC | Age: 42
End: 2022-10-24
Payer: COMMERCIAL

## 2022-11-03 ENCOUNTER — OFFICE VISIT (OUTPATIENT)
Dept: CARDIOLOGY | Facility: CLINIC | Age: 42
End: 2022-11-03
Payer: COMMERCIAL

## 2022-11-03 DIAGNOSIS — E78.2 MIXED HYPERLIPIDEMIA: ICD-10-CM

## 2022-11-03 DIAGNOSIS — I10 PRIMARY HYPERTENSION: Primary | ICD-10-CM

## 2022-11-03 PROCEDURE — 99214 OFFICE O/P EST MOD 30 MIN: CPT | Mod: 95,,, | Performed by: INTERNAL MEDICINE

## 2022-11-03 PROCEDURE — 99214 PR OFFICE/OUTPT VISIT, EST, LEVL IV, 30-39 MIN: ICD-10-PCS | Mod: 95,,, | Performed by: INTERNAL MEDICINE

## 2022-11-03 RX ORDER — HYDROCHLOROTHIAZIDE 12.5 MG/1
12.5 TABLET ORAL DAILY
Qty: 90 TABLET | Refills: 2 | Status: SHIPPED | OUTPATIENT
Start: 2022-11-03 | End: 2023-08-02

## 2022-11-03 NOTE — PROGRESS NOTES
Subjective:   Patient ID:  Foster Hickman is a 42 y.o. male who presents for evaluation of No chief complaint on file.      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 Poor r progression in precordial leads.   No chest pain dyspnea dizziness faint and leg swelling.  Exercise 3 to 4 days a week. 2 to 3 hours a day  No smoking/drinking  No father had stroke at late 50's     Interval history tele visit  SBP variated 130 to 150 mmHG  ETT showed METS 10 and weak BP up to 230 mmHG. No ischemia induced  Reviewed ETT result with pt. ECHO not done yet          Past Medical History:   Diagnosis Date    Migraine        Past Surgical History:   Procedure Laterality Date    ELBOW SURGERY Right     bone spur    VASECTOMY         Social History     Tobacco Use    Smoking status: Never    Smokeless tobacco: Never   Substance Use Topics    Alcohol use: No    Drug use: No       Family History   Problem Relation Age of Onset    Hypertension Mother     Hypertension Father     Heart disease Father     Diabetes Paternal Grandmother     No Known Problems Son     No Known Problems Son     No Known Problems Daughter     Cancer Neg Hx        ROS    Objective:   Physical Exam    Lab Results   Component Value Date    CHOL 246 (H) 08/30/2022    CHOL 275 (H) 10/29/2021     Lab Results   Component Value Date    HDL 42 08/30/2022    HDL 40 10/29/2021     Lab Results   Component Value Date    LDLCALC 188.2 (H) 08/30/2022    LDLCALC 203.8 (H) 10/29/2021     Lab Results   Component Value Date    TRIG 79 08/30/2022    TRIG 156 (H) 10/29/2021     Lab Results   Component Value Date    CHOLHDL 17.1 (L) 08/30/2022    CHOLHDL 14.5 (L) 10/29/2021       Chemistry        Component Value Date/Time     08/30/2022 0842    K 4.7 08/30/2022 0842     (H) 08/30/2022 0842    CO2 23 08/30/2022 0842    BUN 19 08/30/2022 0842    CREATININE 1.2 08/30/2022 0842    GLU  98 08/30/2022 0842        Component Value Date/Time    CALCIUM 11.2 (H) 08/30/2022 0842    ALKPHOS 120 08/30/2022 0842    AST 30 08/30/2022 0842    ALT 37 08/30/2022 0842    BILITOT 0.6 08/30/2022 0842    ESTGFRAFRICA >60 10/29/2021 0731    EGFRNONAA >60 10/29/2021 0731          Lab Results   Component Value Date    HGBA1C 5.5 08/30/2022     Lab Results   Component Value Date    TSH 1.191 08/30/2022     No results found for: INR, PROTIME  Lab Results   Component Value Date    WBC 2.80 (L) 08/30/2022    HGB 15.6 08/30/2022    HCT 45.2 08/30/2022    MCV 91 08/30/2022     08/30/2022     BNP  @LABRCNTIP(BNP,BNPTRIAGEBLO)@  CrCl cannot be calculated (Patient's most recent lab result is older than the maximum 7 days allowed.).  No results found in the last 24 hours.  No results found in the last 24 hours.  No results found in the last 24 hours.    Assessment:      1. Primary hypertension    2. Mixed hyperlipidemia        Plan:   Add HCTZ 12. 5mg daily  Repeat Lipid profile in 3 months    Counseled DASH  Recommend heart-healthy diet, weight control and regular exercise.  Baylee. Risk modification.   I have reviewed all pertinent labs and cardiac studies independently. Plans and recommendations have been formulated under my direct supervision. All questions answered and patient voiced understanding.   If symptoms persist go to the ED  RTC in 12 months    The patient location is: home  The chief complaint leading to consultation is: HTN and HLD    Visit type: audiovisual    Face to Face time with patient: 18 minutes of total time spent on the encounter, which includes face to face time and non-face to face time preparing to see the patient (eg, review of tests), Obtaining and/or reviewing separately obtained history, Documenting clinical information in the electronic or other health record, Independently interpreting results (not separately reported) and communicating results to the patient/family/caregiver, or Care  coordination (not separately reported).         Each patient to whom he or she provides medical services by telemedicine is:  (1) informed of the relationship between the physician and patient and the respective role of any other health care provider with respect to management of the patient; and (2) notified that he or she may decline to receive medical services by telemedicine and may withdraw from such care at any time.    Notes:

## 2022-11-04 ENCOUNTER — TELEPHONE (OUTPATIENT)
Dept: CARDIOLOGY | Facility: CLINIC | Age: 42
End: 2022-11-04
Payer: COMMERCIAL

## 2022-11-04 NOTE — TELEPHONE ENCOUNTER
Pt contacted [facility] for pt to call back about appt            ----- Message from Elder Trinidad MD sent at 11/3/2022  4:59 PM CDT -----  Lipid profile in 3 months  RTC in 1 yr in [place]

## 2023-05-02 ENCOUNTER — PATIENT MESSAGE (OUTPATIENT)
Dept: FAMILY MEDICINE | Facility: CLINIC | Age: 43
End: 2023-05-02
Payer: COMMERCIAL

## 2023-05-02 ENCOUNTER — TELEPHONE (OUTPATIENT)
Dept: FAMILY MEDICINE | Facility: CLINIC | Age: 43
End: 2023-05-02
Payer: COMMERCIAL

## 2023-05-02 NOTE — TELEPHONE ENCOUNTER
Francisco Moore, i was wondering if I needed to get an appointment to get this fmla paperwork completed again. I see your schedule is booked up for the next few weeks. Unfortunately I need this paperwork completed in the next week. Please let me know the best way to move forward. Thanks.     I also included a semi filled copy of the documentation.       Per portal

## 2023-05-03 ENCOUNTER — OFFICE VISIT (OUTPATIENT)
Dept: FAMILY MEDICINE | Facility: CLINIC | Age: 43
End: 2023-05-03
Payer: COMMERCIAL

## 2023-05-03 VITALS
SYSTOLIC BLOOD PRESSURE: 116 MMHG | DIASTOLIC BLOOD PRESSURE: 84 MMHG | WEIGHT: 239.88 LBS | OXYGEN SATURATION: 95 % | HEIGHT: 74 IN | TEMPERATURE: 98 F | BODY MASS INDEX: 30.79 KG/M2 | HEART RATE: 91 BPM

## 2023-05-03 DIAGNOSIS — E66.9 OBESITY (BMI 30.0-34.9): ICD-10-CM

## 2023-05-03 DIAGNOSIS — I10 PRIMARY HYPERTENSION: ICD-10-CM

## 2023-05-03 DIAGNOSIS — G43.109 MIGRAINE WITH AURA AND WITHOUT STATUS MIGRAINOSUS, NOT INTRACTABLE: Primary | ICD-10-CM

## 2023-05-03 PROCEDURE — 99999 PR PBB SHADOW E&M-EST. PATIENT-LVL III: ICD-10-PCS | Mod: PBBFAC,,, | Performed by: FAMILY MEDICINE

## 2023-05-03 PROCEDURE — 99213 OFFICE O/P EST LOW 20 MIN: CPT | Mod: S$GLB,,, | Performed by: FAMILY MEDICINE

## 2023-05-03 PROCEDURE — 99999 PR PBB SHADOW E&M-EST. PATIENT-LVL III: CPT | Mod: PBBFAC,,, | Performed by: FAMILY MEDICINE

## 2023-05-03 PROCEDURE — 99213 PR OFFICE/OUTPT VISIT, EST, LEVL III, 20-29 MIN: ICD-10-PCS | Mod: S$GLB,,, | Performed by: FAMILY MEDICINE

## 2023-05-03 RX ORDER — ELETRIPTAN HYDROBROMIDE 40 MG/1
40 TABLET, FILM COATED ORAL
Qty: 12 TABLET | Refills: 2 | Status: SHIPPED | OUTPATIENT
Start: 2023-05-03 | End: 2023-06-02

## 2023-05-03 NOTE — PROGRESS NOTES
[facility]    Chief Complaint   Patient presents with    Follow-up    Migraine       History of Present Illness:   Mr. Hickman comes in today for completion of FMLA due to migraines. He states FMLA form renewal is needed every 6 months.    He states he currently follows with [facility] for hypertension and takes hydrochlorothiazide daily without problems.    Otherwise, he denies having fever, chills, fatigue, appetite changes; shortness of breath, cough, wheezing; chest pain, palpitations, leg swelling; abdominal pain, nausea, vomiting, diarrhea, constipation; unusual urinary symptoms; polydipsia, polyphagia, polyuria, hot or cold intolerance; back pain; headache (today); anxiety, depression, homicidal or suicidal thoughts.          Current Outpatient Medications   Medication Sig    eletriptan (RELPAX) 40 MG tablet Take 1 tablet (40 mg total) by mouth as needed (migraine headache). may repeat in 2 hours if necessary; max of 2 pills per day    hydroCHLOROthiazide (HYDRODIURIL) 12.5 MG Tab Take 1 tablet (12.5 mg total) by mouth once daily.     Review of Systems   Constitutional:  Negative for activity change, appetite change, chills, fatigue and fever.   Eyes:  Negative for visual disturbance.   Respiratory:  Negative for cough, shortness of breath and wheezing.    Cardiovascular:  Negative for chest pain, palpitations and leg swelling.        See history of present illness.   Gastrointestinal:  Negative for abdominal pain, constipation, diarrhea, nausea and vomiting.   Endocrine: Negative for cold intolerance, heat intolerance, polydipsia, polyphagia and polyuria.   Genitourinary:  Negative for difficulty urinating.   Musculoskeletal:  Negative for back pain.   Neurological:  Positive for headaches.   Psychiatric/Behavioral:  Negative for dysphoric mood and suicidal ideas. The patient is not nervous/anxious.      Objective:  Physical Exam  Vitals reviewed.   Constitutional:       General: He is not in acute  distress.     Appearance: Normal appearance. He is obese. He is not ill-appearing, toxic-appearing or diaphoretic.      Comments: Pleasant.   Cardiovascular:      Rate and Rhythm: Normal rate and regular rhythm.      Pulses: Normal pulses.      Heart sounds: No murmur heard.  Pulmonary:      Effort: Pulmonary effort is normal. No respiratory distress.      Breath sounds: Normal breath sounds. No wheezing.   Abdominal:      General: Bowel sounds are normal. There is no distension.      Palpations: Abdomen is soft. There is no mass.      Tenderness: There is no abdominal tenderness. There is no guarding or rebound.   Musculoskeletal:         General: No swelling or tenderness. Normal range of motion.      Cervical back: Normal range of motion and neck supple. No tenderness.      Comments: He is ambulatory without problems.   Lymphadenopathy:      Cervical: No cervical adenopathy.   Skin:     General: Skin is warm.   Neurological:      General: No focal deficit present.      Mental Status: He is alert and oriented to person, place, and time.   Psychiatric:         Mood and Affect: Mood normal.         Behavior: Behavior normal.         Thought Content: Thought content normal.         Judgment: Judgment normal.       ASSESSMENT:  1. Migraine with aura and without status migrainosus, not intractable    2. Primary hypertension    3. Obesity (BMI 30.0-34.9)        PLAN:  Foster was seen today for follow-up and migraine.    Diagnoses and all orders for this visit:    Migraine with aura and without status migrainosus, not intractable  -     eletriptan (RELPAX) 40 MG tablet; Take 1 tablet (40 mg total) by mouth as needed (migraine headache). may repeat in 2 hours if necessary; max of 2 pills per day    Primary hypertension    Obesity (BMI 30.0-34.9)        Continue current medications, follow low sodium, low cholesterol, low carb diet, daily walks.  Prescription refills as noted above.  FMLA form completed with original given  to patient and copy noted on chart.  Keep follow up with specialists.  Flu shot this fall.  Follow up if symptoms worsen or fail to improve.

## 2023-05-18 ENCOUNTER — TELEPHONE (OUTPATIENT)
Dept: FAMILY MEDICINE | Facility: CLINIC | Age: 43
End: 2023-05-18
Payer: COMMERCIAL

## 2023-05-18 ENCOUNTER — PATIENT MESSAGE (OUTPATIENT)
Dept: FAMILY MEDICINE | Facility: CLINIC | Age: 43
End: 2023-05-18
Payer: COMMERCIAL

## 2023-05-18 NOTE — TELEPHONE ENCOUNTER
Hey doctor montiel hopefully all is well! I wanted to send you an updated copy of the [facility] paperwork. I forgot to fill out a part for the care duration, so this copy has that completed. Thanks        Foster Hanna   2023 [facility] paperwork(1).pdf    Per portal Paperwork at desk

## 2023-05-23 ENCOUNTER — TELEPHONE (OUTPATIENT)
Dept: FAMILY MEDICINE | Facility: CLINIC | Age: 43
End: 2023-05-23
Payer: COMMERCIAL

## 2023-05-23 NOTE — TELEPHONE ENCOUNTER
Please refer to 5/18/2023 phone message and call patient for clarification as I requested.  Then, I can follow up with my comment to give to representative. Thanks.

## 2023-05-23 NOTE — TELEPHONE ENCOUNTER
----- Message from Iris Asher sent at 5/23/2023  8:16 AM CDT -----  Contact: Brooke/Tavon Cox with [employer] is calling to speak with a nurse regarding paperwork. Reports receiving paperwork and there were changes made to the amount of leave needed and request to confirm the last date changes were made. Reports form can be verbally provided or re-faxed with date and initials to [phone]. Please give Brooke a call back at [phone] when possible.  Thank you,  GH

## 2023-05-23 NOTE — TELEPHONE ENCOUNTER
Call pt - explain to him that representative has contacted me regarding paperwork and what's being requested of me and I'm confused as to what information I'm supposed to give.  Is the information as completed on form on 5/3/2023 correct as I'm supposed to clarifiy information to representative? If not, what is different that I should inform representative of?   Thanks.

## 2023-05-24 ENCOUNTER — TELEPHONE (OUTPATIENT)
Dept: FAMILY MEDICINE | Facility: CLINIC | Age: 43
End: 2023-05-24
Payer: COMMERCIAL

## 2023-05-24 NOTE — TELEPHONE ENCOUNTER
----- Message from iForella Woo sent at 5/24/2023 11:05 AM CDT -----  Regarding: Call back  Type:  Patient Returning Call         Who Called:  PT         Who Left Message for Patient: Mitali Santillan MA            Does the patient know what this is regarding?: yes         Would the patient rather a call back or a response via [facility]?  Call back         Best Call Back Number:[phone]         Additional Information:Pt is at work please call after 4:15pm

## 2023-05-31 ENCOUNTER — TELEPHONE (OUTPATIENT)
Dept: FAMILY MEDICINE | Facility: CLINIC | Age: 43
End: 2023-05-31
Payer: COMMERCIAL

## 2023-05-31 ENCOUNTER — PATIENT MESSAGE (OUTPATIENT)
Dept: FAMILY MEDICINE | Facility: CLINIC | Age: 43
End: 2023-05-31
Payer: COMMERCIAL

## 2023-05-31 NOTE — TELEPHONE ENCOUNTER
"Pt portal message    " Good afternoon, I have talked to the [employer] about this [facility] paperwork and have clarification on what they want. On the last page  of the document there is a part c section 3b needed dates for treatment schedule. I updated this part to go 4/27/23 to 4/27/24, following past intervals for previous documents. The [employer] group would like for Dr. Moore to just initial next to these dates as a sign of agreement. I apologize for all the extra time this is taking and confusion this has brought, I know everyone at the office is busy taking care of patients.     I have included a screen shot of the section that was updated as a reference. Please initial your copy of the paperwork and send to the [employer] or send to me and I'll forward to [employer]."    I have printed form and placed at desk  Please advise    "

## 2023-06-01 NOTE — TELEPHONE ENCOUNTER
Please see desk for copy with my initial next to each date with signature with date to the side.     Advise pt.  Thanks.

## 2023-08-02 RX ORDER — HYDROCHLOROTHIAZIDE 12.5 MG/1
12.5 TABLET ORAL
Qty: 90 TABLET | Refills: 2 | Status: SHIPPED | OUTPATIENT
Start: 2023-08-02 | End: 2023-11-21

## 2023-09-13 DIAGNOSIS — Z00.00 ROUTINE GENERAL MEDICAL EXAMINATION AT A HEALTH CARE FACILITY: Primary | ICD-10-CM

## 2023-09-20 ENCOUNTER — OFFICE VISIT (OUTPATIENT)
Dept: FAMILY MEDICINE | Facility: CLINIC | Age: 43
End: 2023-09-20
Payer: COMMERCIAL

## 2023-09-20 VITALS
BODY MASS INDEX: 29.96 KG/M2 | DIASTOLIC BLOOD PRESSURE: 84 MMHG | RESPIRATION RATE: 18 BRPM | HEART RATE: 76 BPM | TEMPERATURE: 97 F | HEIGHT: 74 IN | WEIGHT: 233.44 LBS | OXYGEN SATURATION: 95 % | SYSTOLIC BLOOD PRESSURE: 138 MMHG

## 2023-09-20 DIAGNOSIS — Z09 FOLLOW-UP EXAM: Primary | ICD-10-CM

## 2023-09-20 PROCEDURE — 99214 PR OFFICE/OUTPT VISIT, EST, LEVL IV, 30-39 MIN: ICD-10-PCS | Mod: S$GLB,,, | Performed by: NURSE PRACTITIONER

## 2023-09-20 PROCEDURE — 99999 PR PBB SHADOW E&M-EST. PATIENT-LVL IV: ICD-10-PCS | Mod: PBBFAC,,, | Performed by: NURSE PRACTITIONER

## 2023-09-20 PROCEDURE — 99999 PR PBB SHADOW E&M-EST. PATIENT-LVL IV: CPT | Mod: PBBFAC,,, | Performed by: NURSE PRACTITIONER

## 2023-09-20 PROCEDURE — 99214 OFFICE O/P EST MOD 30 MIN: CPT | Mod: S$GLB,,, | Performed by: NURSE PRACTITIONER

## 2023-09-28 ENCOUNTER — OFFICE VISIT (OUTPATIENT)
Dept: INTERNAL MEDICINE | Facility: CLINIC | Age: 43
End: 2023-09-28

## 2023-09-28 ENCOUNTER — CLINICAL SUPPORT (OUTPATIENT)
Dept: INTERNAL MEDICINE | Facility: CLINIC | Age: 43
End: 2023-09-28

## 2023-09-28 ENCOUNTER — CLINICAL SUPPORT (OUTPATIENT)
Dept: INTERNAL MEDICINE | Facility: CLINIC | Age: 43
End: 2023-09-28
Payer: COMMERCIAL

## 2023-09-28 ENCOUNTER — HOSPITAL ENCOUNTER (OUTPATIENT)
Dept: CARDIOLOGY | Facility: HOSPITAL | Age: 43
Discharge: HOME | End: 2023-09-28

## 2023-09-28 VITALS
TEMPERATURE: 97 F | BODY MASS INDEX: 30.27 KG/M2 | SYSTOLIC BLOOD PRESSURE: 128 MMHG | OXYGEN SATURATION: 98 % | WEIGHT: 235.88 LBS | HEART RATE: 61 BPM | HEIGHT: 74 IN | DIASTOLIC BLOOD PRESSURE: 84 MMHG

## 2023-09-28 DIAGNOSIS — Z00.00 ROUTINE GENERAL MEDICAL EXAMINATION AT A HEALTH CARE FACILITY: Primary | ICD-10-CM

## 2023-09-28 DIAGNOSIS — Z00.00 ROUTINE GENERAL MEDICAL EXAMINATION AT A HEALTH CARE FACILITY: ICD-10-CM

## 2023-09-28 DIAGNOSIS — E83.52 HYPERCALCEMIA: ICD-10-CM

## 2023-09-28 DIAGNOSIS — G43.109 MIGRAINE WITH AURA AND WITHOUT STATUS MIGRAINOSUS, NOT INTRACTABLE: ICD-10-CM

## 2023-09-28 DIAGNOSIS — I10 PRIMARY HYPERTENSION: ICD-10-CM

## 2023-09-28 DIAGNOSIS — E78.2 MIXED HYPERLIPIDEMIA: ICD-10-CM

## 2023-09-28 DIAGNOSIS — E66.9 OBESITY (BMI 30.0-34.9): ICD-10-CM

## 2023-09-28 LAB
ALBUMIN SERPL BCP-MCNC: 4.2 G/DL (ref 3.5–5.2)
ALP SERPL-CCNC: 110 U/L (ref 55–135)
ALT SERPL W/O P-5'-P-CCNC: 32 U/L (ref 10–44)
ANION GAP SERPL CALC-SCNC: 11 MMOL/L (ref 8–16)
AST SERPL-CCNC: 24 U/L (ref 10–40)
BILIRUB SERPL-MCNC: 0.4 MG/DL (ref 0.1–1)
BUN SERPL-MCNC: 16 MG/DL (ref 6–20)
CA-I BLDV-SCNC: 1.62 MMOL/L (ref 1.06–1.42)
CALCIUM SERPL-MCNC: 12 MG/DL (ref 8.7–10.5)
CHLORIDE SERPL-SCNC: 108 MMOL/L (ref 95–110)
CHOLEST SERPL-MCNC: 257 MG/DL (ref 120–199)
CHOLEST/HDLC SERPL: 5.7 {RATIO} (ref 2–5)
CO2 SERPL-SCNC: 22 MMOL/L (ref 23–29)
COMPLEXED PSA SERPL-MCNC: 0.54 NG/ML (ref 0–4)
CREAT SERPL-MCNC: 1.3 MG/DL (ref 0.5–1.4)
ERYTHROCYTE [DISTWIDTH] IN BLOOD BY AUTOMATED COUNT: 13.2 % (ref 11.5–14.5)
EST. GFR  (NO RACE VARIABLE): >60 ML/MIN/1.73 M^2
ESTIMATED AVG GLUCOSE: 114 MG/DL (ref 68–131)
GLUCOSE SERPL-MCNC: 97 MG/DL (ref 70–110)
HBA1C MFR BLD: 5.6 % (ref 4–5.6)
HCT VFR BLD AUTO: 43.2 % (ref 40–54)
HDLC SERPL-MCNC: 45 MG/DL (ref 40–75)
HDLC SERPL: 17.5 % (ref 20–50)
HGB BLD-MCNC: 15.3 G/DL (ref 14–18)
LDLC SERPL CALC-MCNC: 188.6 MG/DL (ref 63–159)
MCH RBC QN AUTO: 31.5 PG (ref 27–31)
MCHC RBC AUTO-ENTMCNC: 35.4 G/DL (ref 32–36)
MCV RBC AUTO: 89 FL (ref 82–98)
NONHDLC SERPL-MCNC: 212 MG/DL
PLATELET # BLD AUTO: 287 K/UL (ref 150–450)
PMV BLD AUTO: 10 FL (ref 9.2–12.9)
POTASSIUM SERPL-SCNC: 3.8 MMOL/L (ref 3.5–5.1)
PROT SERPL-MCNC: 7.5 G/DL (ref 6–8.4)
PTH-INTACT SERPL-MCNC: 227.6 PG/ML (ref 9–77)
RBC # BLD AUTO: 4.86 M/UL (ref 4.6–6.2)
SODIUM SERPL-SCNC: 141 MMOL/L (ref 136–145)
TRIGL SERPL-MCNC: 117 MG/DL (ref 30–150)
TSH SERPL DL<=0.005 MIU/L-ACNC: 1.63 UIU/ML (ref 0.4–4)
WBC # BLD AUTO: 3.64 K/UL (ref 3.9–12.7)

## 2023-09-28 PROCEDURE — 99211 PR NURSE VISIT, 15 MINS, EXEC HLTH ONLY: ICD-10-PCS | Mod: S$GLB,,, | Performed by: INTERNAL MEDICINE

## 2023-09-28 PROCEDURE — 83036 HEMOGLOBIN GLYCOSYLATED A1C: CPT | Performed by: FAMILY MEDICINE

## 2023-09-28 PROCEDURE — 84153 ASSAY OF PSA TOTAL: CPT | Performed by: FAMILY MEDICINE

## 2023-09-28 PROCEDURE — 93010 ELECTROCARDIOGRAM REPORT: CPT | Mod: ,,, | Performed by: INTERNAL MEDICINE

## 2023-09-28 PROCEDURE — 93005 ELECTROCARDIOGRAM TRACING: CPT

## 2023-09-28 PROCEDURE — 90471 IMMUNIZATION ADMIN: CPT | Mod: S$GLB,,, | Performed by: FAMILY MEDICINE

## 2023-09-28 PROCEDURE — 99999 PR PBB SHADOW E&M-EST. PATIENT-LVL IV: CPT | Mod: PBBFAC,,, | Performed by: FAMILY MEDICINE

## 2023-09-28 PROCEDURE — 90471 FLU VACCINE (QUAD) GREATER THAN OR EQUAL TO 3YO PRESERVATIVE FREE IM: ICD-10-PCS | Mod: S$GLB,,, | Performed by: FAMILY MEDICINE

## 2023-09-28 PROCEDURE — 85027 COMPLETE CBC AUTOMATED: CPT | Performed by: FAMILY MEDICINE

## 2023-09-28 PROCEDURE — 99396 PREV VISIT EST AGE 40-64: CPT | Mod: 25,S$GLB,, | Performed by: FAMILY MEDICINE

## 2023-09-28 PROCEDURE — 90686 IIV4 VACC NO PRSV 0.5 ML IM: CPT | Mod: S$GLB,,, | Performed by: FAMILY MEDICINE

## 2023-09-28 PROCEDURE — 84403 ASSAY OF TOTAL TESTOSTERONE: CPT | Performed by: FAMILY MEDICINE

## 2023-09-28 PROCEDURE — 84443 ASSAY THYROID STIM HORMONE: CPT | Performed by: FAMILY MEDICINE

## 2023-09-28 PROCEDURE — 80053 COMPREHEN METABOLIC PANEL: CPT | Performed by: FAMILY MEDICINE

## 2023-09-28 PROCEDURE — 99396 PR PREVENTIVE VISIT,EST,40-64: ICD-10-PCS | Mod: 25,S$GLB,, | Performed by: FAMILY MEDICINE

## 2023-09-28 PROCEDURE — 82330 ASSAY OF CALCIUM: CPT | Performed by: FAMILY MEDICINE

## 2023-09-28 PROCEDURE — 99211 OFF/OP EST MAY X REQ PHY/QHP: CPT | Mod: S$GLB,,, | Performed by: INTERNAL MEDICINE

## 2023-09-28 PROCEDURE — 99999 PR PBB SHADOW E&M-EST. PATIENT-LVL IV: ICD-10-PCS | Mod: PBBFAC,,, | Performed by: FAMILY MEDICINE

## 2023-09-28 PROCEDURE — 80061 LIPID PANEL: CPT | Performed by: FAMILY MEDICINE

## 2023-09-28 PROCEDURE — 93010 EKG 12-LEAD: ICD-10-PCS | Mod: ,,, | Performed by: INTERNAL MEDICINE

## 2023-09-28 PROCEDURE — 84270 ASSAY OF SEX HORMONE GLOBUL: CPT | Performed by: FAMILY MEDICINE

## 2023-09-28 PROCEDURE — 90686 FLU VACCINE (QUAD) GREATER THAN OR EQUAL TO 3YO PRESERVATIVE FREE IM: ICD-10-PCS | Mod: S$GLB,,, | Performed by: FAMILY MEDICINE

## 2023-09-28 PROCEDURE — 83970 ASSAY OF PARATHORMONE: CPT | Performed by: FAMILY MEDICINE

## 2023-09-28 NOTE — PROGRESS NOTES
"Subjective:       Patient ID: Foster Hickman is a 43 y.o. male.    Chief Complaint: Executive Health    43-year-old  male patient with Patient Active Problem List:     Migraine without status migrainosus, not intractable     Obesity (BMI 30.0-34.9)     Primary hypertension     Mixed hyperlipidemia  Here for executive health physical  Patient has been staying physically active but reports that he has been having fatigue, and would like to get testosterone levels checked  Exercising regularly but works shift work  Denies any changes in appetite or weight or muscle cramping  Patient reports that he has been taking Pepcid AC over-the-counter more frequently lately         Review of Systems   Constitutional:  Positive for fatigue. Negative for appetite change.   Eyes:  Negative for visual disturbance.   Respiratory:  Negative for shortness of breath.    Cardiovascular:  Negative for chest pain, palpitations and leg swelling.   Gastrointestinal:  Negative for abdominal pain, nausea and vomiting.   Musculoskeletal:  Negative for myalgias.   Skin:  Negative for rash.   Neurological:  Negative for headaches.   Psychiatric/Behavioral:  Negative for sleep disturbance.          /84 (BP Location: Left arm, Patient Position: Sitting, BP Method: Large (Manual))   Pulse 61   Temp 97.4 °F (36.3 °C) (Tympanic)   Ht 6' 2" (1.88 m)   Wt 107 kg (235 lb 14.3 oz)   SpO2 98%   BMI 30.29 kg/m²   Objective:      Physical Exam  Constitutional:       Appearance: He is well-developed.   HENT:      Head: Normocephalic and atraumatic.   Cardiovascular:      Rate and Rhythm: Normal rate and regular rhythm.      Heart sounds: Normal heart sounds. No murmur heard.  Pulmonary:      Effort: Pulmonary effort is normal.      Breath sounds: Normal breath sounds. No wheezing.   Abdominal:      General: Bowel sounds are normal.      Palpations: Abdomen is soft.      Tenderness: There is no abdominal tenderness.   Skin:     " General: Skin is warm and dry.      Findings: No rash.   Neurological:      Mental Status: He is alert and oriented to person, place, and time.   Psychiatric:         Mood and Affect: Mood normal.           Assessment/Plan:   1. Routine general medical examination at a health care facility  -     TESTOSTERONE PANEL; Future; Expected date: 09/28/2023  Vital signs stable today.  Clinical exam stable  Continue lifestyle modifications with low-fat and low-cholesterol diet and exercise 30 minutes daily  Will add testosterone levels    2. Primary hypertension  Blood pressure is stable currently on hydrochlorothiazide 12.5 mg but patient was encouraged to hold off on hydrochlorothiazide at this time and monitor his blood pressures secondary to high calcium levels, discuss further with primary care    3. Mixed hyperlipidemia  Encouraged to consider starting dose of cholesterol medication secondary to elevated cholesterol    4. Migraine with aura and without status migrainosus, not intractable  Stable on Relpax as needed    5. Obesity (BMI 30.0-34.9)  Lifestyle modifications recommended to lose weight with BMI 30    6. Hypercalcemia  -     CALCIUM, IONIZED; Future; Expected date: 09/28/2023  -     PTH, Intact; Future; Expected date: 09/28/2023  -     Ambulatory referral/consult to Endocrinology; Future; Expected date: 10/05/2023  Reviewed recent labs showing elevated calcium levels-will add serum ionized calcium and parathyroid for further evaluation and patient was encouraged to discontinue Pepcid AC over-the-counter and hold off on hydrochlorothiazide at this time and discuss further with endocrinology, external referral placed in encouraged to make an appointment with endocrinologist either at our [facility] or [facility] for further evaluation as patient is not interested to go to [facility] with [facility]    Other orders  -     Influenza - Quadrivalent (PF)  Flu shot given today

## 2023-10-09 DIAGNOSIS — R94.31 ABNORMAL EKG: Primary | ICD-10-CM

## 2023-10-12 NOTE — PROGRESS NOTES
Foster Hickman  10/12/2023  [id number]    Norma Moore MD  Patient Care Team:  Norma Moore MD as PCP - General (Family Medicine)  Norma Moore MD as Hypertension Digital Medicine Responsible Provider (Family Medicine)  Deborah Redmond PharmD as Hypertension Digital Medicine Clinician (Pharmacist)  [employer] as Hypertension Digital Medicine Contract  Jillian Valencia as Digital Medicine Health   Ladan Navarro as Digital Medicine Health           Visit Type:a scheduled routine follow-up visit    Chief Complaint:  Chief Complaint   Patient presents with    Follow-up       History of Present Illness:    44 yo male presents today requesting completion for FMLA paperwork for intermittent leave for work.    History:  Past Medical History:   Diagnosis Date    Migraine      Past Surgical History:   Procedure Laterality Date    ELBOW SURGERY Right     bone spur    VASECTOMY       Family History   Problem Relation Age of Onset    Hypertension Mother     Hypertension Father     Heart disease Father     Diabetes Paternal Grandmother     No Known Problems Son     No Known Problems Son     No Known Problems Daughter     Cancer Neg Hx      Social History     Socioeconomic History    Marital status:    Occupational History     Comment: [employer]   Tobacco Use    Smoking status: Never    Smokeless tobacco: Never   Substance and Sexual Activity    Alcohol use: Yes     Comment: occ    Drug use: No    Sexual activity: Yes     Partners: Female     Social Determinants of Health     Social Connections: Unknown (7/28/2020)    Social Connection and Isolation Panel [NHANES]     Marital Status:      Patient Active Problem List   Diagnosis    Migraine without status migrainosus, not intractable    Obesity (BMI 30.0-34.9)    Primary hypertension    Mixed hyperlipidemia     Review of patient's allergies indicates:  No Known Allergies    The following were reviewed at this visit:  active problem list, medication list, allergies, family history, social history, and health maintenance.    Medications:  Current Outpatient Medications on File Prior to Visit   Medication Sig Dispense Refill    hydroCHLOROthiazide (HYDRODIURIL) 12.5 MG Tab TAKE 1 TABLET(12.5 MG) BY MOUTH EVERY DAY 90 tablet 2    eletriptan (RELPAX) 40 MG tablet Take 1 tablet (40 mg total) by mouth as needed (migraine headache). may repeat in 2 hours if necessary; max of 2 pills per day 12 tablet 2     No current facility-administered medications on file prior to visit.       Medications have been reviewed and reconciled with patient at this visit.  Barriers to medications reviewed with patient.    Adverse reactions to current medications reviewed with patient..    Over the counter medications reviewed and reconciled with patient.    Exam:  Wt Readings from Last 3 Encounters:   09/28/23 107 kg (235 lb 14.3 oz)   09/20/23 105.9 kg (233 lb 7.5 oz)   05/03/23 108.8 kg (239 lb 13.8 oz)     Temp Readings from Last 3 Encounters:   09/28/23 97.4 °F (36.3 °C) (Tympanic)   09/20/23 96.7 °F (35.9 °C) (Temporal)   05/03/23 97.9 °F (36.6 °C)     BP Readings from Last 3 Encounters:   09/28/23 128/84   09/20/23 138/84   05/03/23 116/84     Pulse Readings from Last 3 Encounters:   09/28/23 61   09/20/23 76   05/03/23 91     Body mass index is 29.98 kg/m².      Review of Systems   Constitutional:  Negative for fever.   Respiratory:  Negative for cough, shortness of breath and wheezing.    Cardiovascular:  Negative for chest pain and palpitations.   Gastrointestinal:  Negative for nausea.   Neurological:  Negative for speech change, weakness and headaches.   All other systems reviewed and are negative.    Physical Exam  Vitals and nursing note reviewed.   Constitutional:       Appearance: Normal appearance. He is normal weight.   HENT:      Head: Normocephalic and atraumatic.      Right Ear: Tympanic membrane, ear canal and external ear normal.       Left Ear: Tympanic membrane, ear canal and external ear normal.      Nose: Nose normal.      Mouth/Throat:      Mouth: Mucous membranes are moist.      Pharynx: Oropharynx is clear.   Eyes:      Extraocular Movements: Extraocular movements intact.      Conjunctiva/sclera: Conjunctivae normal.      Pupils: Pupils are equal, round, and reactive to light.   Cardiovascular:      Rate and Rhythm: Normal rate and regular rhythm.      Pulses: Normal pulses.      Heart sounds: Normal heart sounds.   Pulmonary:      Effort: Pulmonary effort is normal.      Breath sounds: Normal breath sounds.   Abdominal:      General: Bowel sounds are normal.      Palpations: Abdomen is soft.   Musculoskeletal:         General: Normal range of motion.      Cervical back: Normal range of motion and neck supple.   Skin:     General: Skin is warm and dry.      Capillary Refill: Capillary refill takes less than 2 seconds.   Neurological:      General: No focal deficit present.      Mental Status: He is alert and oriented to person, place, and time.   Psychiatric:         Mood and Affect: Mood normal.         Behavior: Behavior normal.         Thought Content: Thought content normal.         Judgment: Judgment normal.         Laboratory Reviewed ({Yes)  Lab Results   Component Value Date    WBC 3.64 (L) 09/28/2023    HGB 15.3 09/28/2023    HCT 43.2 09/28/2023     09/28/2023    CHOL 257 (H) 09/28/2023    TRIG 117 09/28/2023    HDL 45 09/28/2023    ALT 32 09/28/2023    AST 24 09/28/2023     09/28/2023    K 3.8 09/28/2023     09/28/2023    CREATININE 1.3 09/28/2023    BUN 16 09/28/2023    CO2 22 (L) 09/28/2023    TSH 1.628 09/28/2023    PSA 0.54 09/28/2023    HGBA1C 5.6 09/28/2023       Foster was seen today for follow-up.    Diagnoses and all orders for this visit:    Follow-up exam                Care Plan/Goals: Reviewed    Goals         Blood Pressure < 130/80 (pt-stated)       Exercise at least 150 minutes per week.        Maintain a low sodium diet       American Heart Association (AHA) recommends less than 1,500mg of sodium per day.        Take at least one BP reading per week at various times of the day             Follow up: Follow up for with  for 6 month follow up.    After visit summary was printed and given to patient upon discharge today.  Patient goals and care plan are included in After Visit Summary.

## 2023-10-13 LAB
ALBUMIN SERPL-MCNC: 4.4 G/DL (ref 3.6–5.1)
SHBG SERPL-SCNC: 24 NMOL/L (ref 10–50)
TESTOST FREE SERPL-MCNC: 50.5 PG/ML (ref 46–224)
TESTOST SERPL-MCNC: 308 NG/DL (ref 250–1100)
TESTOSTERONE.FREE+WB SERPL-MCNC: 101.7 NG/DL (ref 110–575)

## 2023-11-13 ENCOUNTER — OFFICE VISIT (OUTPATIENT)
Dept: CARDIOLOGY | Facility: CLINIC | Age: 43
End: 2023-11-13
Payer: COMMERCIAL

## 2023-11-13 DIAGNOSIS — E78.2 MIXED HYPERLIPIDEMIA: ICD-10-CM

## 2023-11-13 DIAGNOSIS — I10 PRIMARY HYPERTENSION: Primary | ICD-10-CM

## 2023-11-13 PROCEDURE — 99214 PR OFFICE/OUTPT VISIT, EST, LEVL IV, 30-39 MIN: ICD-10-PCS | Mod: 95,,, | Performed by: INTERNAL MEDICINE

## 2023-11-13 PROCEDURE — 99214 OFFICE O/P EST MOD 30 MIN: CPT | Mod: 95,,, | Performed by: INTERNAL MEDICINE

## 2023-11-13 RX ORDER — LOSARTAN POTASSIUM 50 MG/1
50 TABLET ORAL DAILY
Qty: 90 TABLET | Refills: 3 | Status: SHIPPED | OUTPATIENT
Start: 2023-11-13 | End: 2024-11-12

## 2023-11-13 RX ORDER — ATORVASTATIN CALCIUM 20 MG/1
20 TABLET, FILM COATED ORAL NIGHTLY
Qty: 90 TABLET | Refills: 3 | Status: SHIPPED | OUTPATIENT
Start: 2023-11-13 | End: 2024-11-12

## 2023-11-13 NOTE — PROGRESS NOTES
Subjective:   Patient ID:  Foster Hickman is a 43 y.o. male who presents for follow up of No chief complaint on file.      43 yo male, came in for HTN. telenote  Works at plant  Recent wellness exam, EKG NSR bradycardia 1st AVB and incomplete RBBB. Poor r progression in precordial leads.   No chest pain dyspnea dizziness faint and leg swelling.  Exercise 3 to 4 days a week. 2 to 3 hours a day  No smoking/drinking  No father had stroke at late 50's     11/22 visit tele visit  SBP variated 130 to 150 mmHG  ETT showed METS 10 and weak BP up to 230 mmHG. No ischemia induced  Reviewed ETT result with pt. ECHO not done yet    Interval history tele note  BP controlled at home. Med compliance regular exercise  He denied chest pain, dyspnea on exertion, palpitation, fainting, PND, orthopnea, syncope and claudication.   On healthy diet and weight control  HCTZ caused high calicum. Off few days and caused headache. Remains on HCTZ    The 10-year ASCVD risk score (Saida DK, et al., 2019) is: 6.8%    Values used to calculate the score:      Age: 43 years      Sex: Male      Is Non- : Yes      Diabetic: No      Tobacco smoker: No      Systolic Blood Pressure: 128 mmHg      Is BP treated: Yes      HDL Cholesterol: 45 mg/dL      Total Cholesterol: 257 mg/dL                Past Medical History:   Diagnosis Date    Migraine        Past Surgical History:   Procedure Laterality Date    ELBOW SURGERY Right     bone spur    VASECTOMY         Social History     Tobacco Use    Smoking status: Never    Smokeless tobacco: Never   Substance Use Topics    Alcohol use: Yes     Comment: occ    Drug use: No       Family History   Problem Relation Age of Onset    Hypertension Mother     Hypertension Father     Heart disease Father     Diabetes Paternal Grandmother     No Known Problems Son     No Known Problems Son     No Known Problems Daughter     Cancer Neg Hx          ROS    Objective:   Physical Exam    Lab  "Results   Component Value Date    CHOL 257 (H) 09/28/2023    CHOL 246 (H) 08/30/2022    CHOL 275 (H) 10/29/2021     Lab Results   Component Value Date    HDL 45 09/28/2023    HDL 42 08/30/2022    HDL 40 10/29/2021     Lab Results   Component Value Date    LDLCALC 188.6 (H) 09/28/2023    LDLCALC 188.2 (H) 08/30/2022    LDLCALC 203.8 (H) 10/29/2021     Lab Results   Component Value Date    TRIG 117 09/28/2023    TRIG 79 08/30/2022    TRIG 156 (H) 10/29/2021     Lab Results   Component Value Date    CHOLHDL 17.5 (L) 09/28/2023    CHOLHDL 17.1 (L) 08/30/2022    CHOLHDL 14.5 (L) 10/29/2021       Chemistry        Component Value Date/Time     09/28/2023 0852    K 3.8 09/28/2023 0852     09/28/2023 0852    CO2 22 (L) 09/28/2023 0852    BUN 16 09/28/2023 0852    CREATININE 1.3 09/28/2023 0852    GLU 97 09/28/2023 0852        Component Value Date/Time    CALCIUM 12.0 (H) 09/28/2023 0852    ALKPHOS 110 09/28/2023 0852    AST 24 09/28/2023 0852    ALT 32 09/28/2023 0852    BILITOT 0.4 09/28/2023 0852    ESTGFRAFRICA >60 10/29/2021 0731    EGFRNONAA >60 10/29/2021 0731          Lab Results   Component Value Date    HGBA1C 5.6 09/28/2023     Lab Results   Component Value Date    TSH 1.628 09/28/2023     No results found for: "INR", "PROTIME"  Lab Results   Component Value Date    WBC 3.64 (L) 09/28/2023    HGB 15.3 09/28/2023    HCT 43.2 09/28/2023    MCV 89 09/28/2023     09/28/2023     BMP  Sodium   Date Value Ref Range Status   09/28/2023 141 136 - 145 mmol/L Final     Potassium   Date Value Ref Range Status   09/28/2023 3.8 3.5 - 5.1 mmol/L Final     Chloride   Date Value Ref Range Status   09/28/2023 108 95 - 110 mmol/L Final     CO2   Date Value Ref Range Status   09/28/2023 22 (L) 23 - 29 mmol/L Final     BUN   Date Value Ref Range Status   09/28/2023 16 6 - 20 mg/dL Final     Creatinine   Date Value Ref Range Status   09/28/2023 1.3 0.5 - 1.4 mg/dL Final     Calcium   Date Value Ref Range Status "   09/28/2023 12.0 (H) 8.7 - 10.5 mg/dL Final     Anion Gap   Date Value Ref Range Status   09/28/2023 11 8 - 16 mmol/L Final     eGFR if    Date Value Ref Range Status   10/29/2021 >60 >60 mL/min/1.73 m^2 Final     eGFR if non    Date Value Ref Range Status   10/29/2021 >60 >60 mL/min/1.73 m^2 Final     Comment:     Calculation used to obtain the estimated glomerular filtration  rate (eGFR) is the CKD-EPI equation.        BNP  @LABRCNTIP(BNP,BNPTRIAGEBLO)@  @LABRCNTIP(troponini)@  CrCl cannot be calculated (Patient's most recent lab result is older than the maximum 7 days allowed.).  No results found in the last 24 hours.  No results found in the last 24 hours.  No results found in the last 24 hours.    Assessment:      1. Primary hypertension    2. Mixed hyperlipidemia        Plan:   D/c hctz due to high ca level  Add losartan 50 mg daily for HTN  Add Lipitor 20 mg qhs for HLD  CMP and Lipid  profile in 6 Months    Counseled DASH  Check Lipid profile with PCP in 6 months  Recommend heart-healthy diet, weight control and regular exercise.  Baylee. Risk modification.   I have reviewed all pertinent labs and cardiac studies independently. Plans and recommendations have been formulated under my direct supervision. All questions answered and patient voiced understanding.   If symptoms persist go to the ED  RTC in 12 m      The patient location is: home  The chief complaint leading to consultation is: HTN and HLD    Visit type: audiovisual    Face to Face time with patient: 18 minutes of total time spent on the encounter, which includes face to face time and non-face to face time preparing to see the patient (eg, review of tests), Obtaining and/or reviewing separately obtained history, Documenting clinical information in the electronic or other health record, Independently interpreting results (not separately reported) and communicating results to the patient/family/caregiver, or Care  coordination (not separately reported).         Each patient to whom he or she provides medical services by telemedicine is:  (1) informed of the relationship between the physician and patient and the respective role of any other health care provider with respect to management of the patient; and (2) notified that he or she may decline to receive medical services by telemedicine and may withdraw from such care at any time.    Notes:

## 2023-12-28 ENCOUNTER — LAB VISIT (OUTPATIENT)
Dept: LAB | Facility: HOSPITAL | Age: 43
End: 2023-12-28
Attending: INTERNAL MEDICINE
Payer: COMMERCIAL

## 2023-12-28 DIAGNOSIS — E78.2 MIXED HYPERLIPIDEMIA: ICD-10-CM

## 2023-12-28 LAB
ALBUMIN SERPL BCP-MCNC: 4 G/DL (ref 3.5–5.2)
ALP SERPL-CCNC: 100 U/L (ref 55–135)
ALT SERPL W/O P-5'-P-CCNC: 50 U/L (ref 10–44)
ANION GAP SERPL CALC-SCNC: 7 MMOL/L (ref 8–16)
AST SERPL-CCNC: 41 U/L (ref 10–40)
BILIRUB SERPL-MCNC: 0.6 MG/DL (ref 0.1–1)
BUN SERPL-MCNC: 16 MG/DL (ref 6–20)
CALCIUM SERPL-MCNC: 11.8 MG/DL (ref 8.7–10.5)
CHLORIDE SERPL-SCNC: 109 MMOL/L (ref 95–110)
CHOLEST SERPL-MCNC: 180 MG/DL (ref 120–199)
CHOLEST/HDLC SERPL: 4.3 {RATIO} (ref 2–5)
CO2 SERPL-SCNC: 26 MMOL/L (ref 23–29)
CREAT SERPL-MCNC: 1.3 MG/DL (ref 0.5–1.4)
EST. GFR  (NO RACE VARIABLE): >60 ML/MIN/1.73 M^2
GLUCOSE SERPL-MCNC: 94 MG/DL (ref 70–110)
HDLC SERPL-MCNC: 42 MG/DL (ref 40–75)
HDLC SERPL: 23.3 % (ref 20–50)
LDLC SERPL CALC-MCNC: 119.6 MG/DL (ref 63–159)
NONHDLC SERPL-MCNC: 138 MG/DL
POTASSIUM SERPL-SCNC: 4.5 MMOL/L (ref 3.5–5.1)
PROT SERPL-MCNC: 7 G/DL (ref 6–8.4)
SODIUM SERPL-SCNC: 142 MMOL/L (ref 136–145)
TRIGL SERPL-MCNC: 92 MG/DL (ref 30–150)

## 2023-12-28 PROCEDURE — 80061 LIPID PANEL: CPT | Performed by: INTERNAL MEDICINE

## 2023-12-28 PROCEDURE — 80053 COMPREHEN METABOLIC PANEL: CPT | Performed by: INTERNAL MEDICINE

## 2023-12-28 PROCEDURE — 36415 COLL VENOUS BLD VENIPUNCTURE: CPT | Performed by: INTERNAL MEDICINE

## 2024-01-02 ENCOUNTER — TELEPHONE (OUTPATIENT)
Dept: CARDIOLOGY | Facility: CLINIC | Age: 44
End: 2024-01-02
Payer: COMMERCIAL

## 2024-01-02 NOTE — TELEPHONE ENCOUNTER
Contacted patient; Patient received and understood results with no questions or concerns.         ----- Message from Elder Trinidad MD sent at 1/1/2024 11:55 AM CST -----  The lab showed LDL decreased to 118.  Continue current Rx  F/U as scheduled

## 2024-03-05 ENCOUNTER — PATIENT MESSAGE (OUTPATIENT)
Dept: FAMILY MEDICINE | Facility: CLINIC | Age: 44
End: 2024-03-05
Payer: COMMERCIAL

## 2024-03-07 ENCOUNTER — OFFICE VISIT (OUTPATIENT)
Dept: FAMILY MEDICINE | Facility: CLINIC | Age: 44
End: 2024-03-07
Payer: COMMERCIAL

## 2024-03-07 DIAGNOSIS — G43.109 MIGRAINE WITH AURA AND WITHOUT STATUS MIGRAINOSUS, NOT INTRACTABLE: Primary | ICD-10-CM

## 2024-03-07 PROCEDURE — 99214 OFFICE O/P EST MOD 30 MIN: CPT | Mod: 95,,, | Performed by: NURSE PRACTITIONER

## 2024-03-07 PROCEDURE — 1159F MED LIST DOCD IN RCRD: CPT | Mod: CPTII,95,, | Performed by: NURSE PRACTITIONER

## 2024-03-07 PROCEDURE — 1160F RVW MEDS BY RX/DR IN RCRD: CPT | Mod: CPTII,95,, | Performed by: NURSE PRACTITIONER

## 2024-03-07 NOTE — PROGRESS NOTES
Foster Hickman  03/07/2024  [id number]      The patient location is: home  The chief complaint leading to consultation is: follow up for migraines   Visit type: Virtual visit with synchronous audio and video  Total time spent with patient: 7 min  Each patient to whom he or she provides medical services by telemedicine is:  (1) informed of the relationship between the physician and patient and the respective role of any other health care provider with respect to management of the patient; and (2) notified that he or she may decline to receive medical services by telemedicine and may withdraw from such care at any time.        Chief Complaint:      History of Present Illness:  43 yo male states improvement in migraine frequency. Admits he has not had a migraine in a month since he has been out of work.  No other complaints at this time and no medication refills requested.  Will send over 6 month [facility] paperwork       Review of Systems   Constitutional:  Negative for fever.   Respiratory:  Negative for cough, shortness of breath and wheezing.    Cardiovascular:  Negative for chest pain and palpitations.   Gastrointestinal:  Negative for nausea.   Neurological:  Negative for speech change, weakness and headaches.   All other systems reviewed and are negative.        History:  Past Medical History:   Diagnosis Date    Migraine      Past Surgical History:   Procedure Laterality Date    ELBOW SURGERY Right     bone spur    VASECTOMY         Family History   Problem Relation Age of Onset    Hypertension Mother     Hypertension Father     Heart disease Father     Diabetes Paternal Grandmother     No Known Problems Son     No Known Problems Son     No Known Problems Daughter     Cancer Neg Hx      Social History     Socioeconomic History    Marital status:    Occupational History     Comment: [employer]   Tobacco Use    Smoking status: Never    Smokeless tobacco: Never   Substance and Sexual Activity    Alcohol use: Yes      Comment: occ    Drug use: No    Sexual activity: Yes     Partners: Female     Social Determinants of Health     Social Connections: Unknown (7/28/2020)    Social Connection and Isolation Panel [NHANES]     Marital Status:      Patient Active Problem List   Diagnosis    Migraine without status migrainosus, not intractable    Obesity (BMI 30.0-34.9)    Primary hypertension    Mixed hyperlipidemia     Review of patient's allergies indicates:  No Known Allergies    The following were reviewed at this visit: active problem list, medication list, allergies, family history, social history, and health maintenance.    Medications:  Current Outpatient Medications on File Prior to Visit   Medication Sig Dispense Refill    atorvastatin (LIPITOR) 20 MG tablet Take 1 tablet (20 mg total) by mouth every evening. 90 tablet 3    eletriptan (RELPAX) 40 MG tablet Take 1 tablet (40 mg total) by mouth as needed (migraine headache). may repeat in 2 hours if necessary; max of 2 pills per day 12 tablet 2    losartan (COZAAR) 50 MG tablet Take 1 tablet (50 mg total) by mouth once daily. 90 tablet 3     No current facility-administered medications on file prior to visit.       Exam:  Wt Readings from Last 3 Encounters:   09/28/23 107 kg (235 lb 14.3 oz)   09/20/23 105.9 kg (233 lb 7.5 oz)   05/03/23 108.8 kg (239 lb 13.8 oz)     Temp Readings from Last 3 Encounters:   09/28/23 97.4 °F (36.3 °C) (Tympanic)   09/20/23 96.7 °F (35.9 °C) (Temporal)   05/03/23 97.9 °F (36.6 °C)     BP Readings from Last 3 Encounters:   09/28/23 128/84   09/20/23 138/84   05/03/23 116/84     Pulse Readings from Last 3 Encounters:   09/28/23 61   09/20/23 76   05/03/23 91     There is no height or weight on file to calculate BMI.      @ROS@    Physical Exam  Vitals and nursing note reviewed.   Constitutional:       Appearance: Normal appearance.   Eyes:      Conjunctiva/sclera: Conjunctivae normal.   Pulmonary:      Effort: Pulmonary effort is normal.    Neurological:      General: No focal deficit present.      Mental Status: He is alert and oriented to person, place, and time.   Psychiatric:         Mood and Affect: Mood normal.         Behavior: Behavior normal.         Thought Content: Thought content normal.         Judgment: Judgment normal.         Laboratory Reviewed ({Yes)  Lab Results   Component Value Date    WBC 3.64 (L) 09/28/2023    HGB 15.3 09/28/2023    HCT 43.2 09/28/2023     09/28/2023    CHOL 180 12/28/2023    TRIG 92 12/28/2023    HDL 42 12/28/2023    ALT 50 (H) 12/28/2023    AST 41 (H) 12/28/2023     12/28/2023    K 4.5 12/28/2023     12/28/2023    CREATININE 1.3 12/28/2023    BUN 16 12/28/2023    CO2 26 12/28/2023    TSH 1.628 09/28/2023    PSA 0.54 09/28/2023    HGBA1C 5.6 09/28/2023       Diagnoses and all orders for this visit:    Migraine with aura and without status migrainosus, not intractable      Answers submitted by the patient for this visit:  Review of Systems Questionnaire (Submitted on 3/5/2024)  activity change: No  unexpected weight change: No  rhinorrhea: No  trouble swallowing: No  visual disturbance: No  chest tightness: No  polyuria: No  difficulty urinating: No  joint swelling: No  arthralgias: No  confusion: No  dysphoric mood: No

## 2024-05-28 DIAGNOSIS — G43.109 MIGRAINE WITH AURA AND WITHOUT STATUS MIGRAINOSUS, NOT INTRACTABLE: ICD-10-CM

## 2024-05-28 RX ORDER — ELETRIPTAN HYDROBROMIDE 40 MG/1
TABLET, FILM COATED ORAL
Qty: 27 TABLET | Refills: 0 | Status: SHIPPED | OUTPATIENT
Start: 2024-05-28

## 2024-05-28 NOTE — TELEPHONE ENCOUNTER
Care Due:                  Date            Visit Type   Department     Provider  --------------------------------------------------------------------------------                                MYCHART                              FOLLOWUP/OF  [facility]  Last Visit: 05-      FICE VISIT   MEDICINE       Norma Moore  Next Visit: None Scheduled  None         None Found                                                            Last  Test          Frequency    Reason                     Performed    Due Date  --------------------------------------------------------------------------------    Office Visit  15 months..  eletriptan...............  05- 07-    [facility] Embedded Care Due Messages. Reference number: [id number].   5/28/2024 4:36:35 AM CDT

## 2024-05-28 NOTE — TELEPHONE ENCOUNTER
Provider Staff:  Action required for this patient    Requires appointment      Please see care gap opportunities below in Care Due Message.    Thanks!  [facility]     Appointments      Date Provider   Last Visit   5/3/2023 Norma Moore MD   Next Visit   Visit date not found Norma Moore MD     Refill Decision Note   Foster Hickman  is requesting a refill authorization.  Brief Assessment and Rationale for Refill:  Approve     Medication Therapy Plan:        Pharmacist review requested: Yes   Comments:     Note composed:10:43 AM 05/28/2024

## 2024-05-28 NOTE — TELEPHONE ENCOUNTER
Refill Routing Note   Medication(s) are not appropriate for processing by [facility] for the following reason(s):     DDI not previously overridden by current provider--after initial override, the [facility] Center will be able to continue overrides      Drug-disease interaction: eletriptan and Primary hypertension     [facility] action(s):  Defer   Requires appointment : Yes           Pharmacist review requested: Yes     Appointments  past 12m or future 3m with PCP    Date Provider   Last Visit   5/3/2023 Norma Moore MD   Next Visit   Visit date not found Norma Moore MD   ED visits in past 90 days: 0        Note composed:5:07 AM 05/28/2024

## 2024-07-31 DIAGNOSIS — Z00.00 ROUTINE GENERAL MEDICAL EXAMINATION AT A HEALTH CARE FACILITY: Primary | ICD-10-CM

## 2024-08-07 ENCOUNTER — HOSPITAL ENCOUNTER (OUTPATIENT)
Dept: CARDIOLOGY | Facility: HOSPITAL | Age: 44
Discharge: HOME | End: 2024-08-07
Payer: COMMERCIAL

## 2024-08-07 ENCOUNTER — CLINICAL SUPPORT (OUTPATIENT)
Dept: INTERNAL MEDICINE | Facility: CLINIC | Age: 44
End: 2024-08-07
Payer: COMMERCIAL

## 2024-08-07 ENCOUNTER — OFFICE VISIT (OUTPATIENT)
Dept: INTERNAL MEDICINE | Facility: CLINIC | Age: 44
End: 2024-08-07
Payer: COMMERCIAL

## 2024-08-07 VITALS
OXYGEN SATURATION: 98 % | BODY MASS INDEX: 31.32 KG/M2 | WEIGHT: 244 LBS | HEIGHT: 74 IN | SYSTOLIC BLOOD PRESSURE: 123 MMHG | RESPIRATION RATE: 18 BRPM | HEART RATE: 57 BPM | TEMPERATURE: 97 F | DIASTOLIC BLOOD PRESSURE: 80 MMHG

## 2024-08-07 DIAGNOSIS — Z00.00 ROUTINE GENERAL MEDICAL EXAMINATION AT A HEALTH CARE FACILITY: ICD-10-CM

## 2024-08-07 DIAGNOSIS — E21.0 PRIMARY HYPERPARATHYROIDISM: ICD-10-CM

## 2024-08-07 DIAGNOSIS — G43.109 MIGRAINE WITH AURA AND WITHOUT STATUS MIGRAINOSUS, NOT INTRACTABLE: ICD-10-CM

## 2024-08-07 DIAGNOSIS — Z00.00 ROUTINE GENERAL MEDICAL EXAMINATION AT A HEALTH CARE FACILITY: Primary | ICD-10-CM

## 2024-08-07 DIAGNOSIS — E78.2 MIXED HYPERLIPIDEMIA: ICD-10-CM

## 2024-08-07 DIAGNOSIS — I10 PRIMARY HYPERTENSION: ICD-10-CM

## 2024-08-07 PROBLEM — E66.9 OBESITY (BMI 30.0-34.9): Status: RESOLVED | Noted: 2022-07-28 | Resolved: 2024-08-07

## 2024-08-07 PROBLEM — E66.811 OBESITY (BMI 30.0-34.9): Status: RESOLVED | Noted: 2022-07-28 | Resolved: 2024-08-07

## 2024-08-07 LAB
ALBUMIN SERPL BCP-MCNC: 4.1 G/DL (ref 3.5–5.2)
ALP SERPL-CCNC: 128 U/L (ref 55–135)
ALT SERPL W/O P-5'-P-CCNC: 50 U/L (ref 10–44)
ANION GAP SERPL CALC-SCNC: 7 MMOL/L (ref 8–16)
AST SERPL-CCNC: 30 U/L (ref 10–40)
BILIRUB SERPL-MCNC: 0.5 MG/DL (ref 0.1–1)
BUN SERPL-MCNC: 12 MG/DL (ref 6–20)
CALCIUM SERPL-MCNC: 11.5 MG/DL (ref 8.7–10.5)
CHLORIDE SERPL-SCNC: 110 MMOL/L (ref 95–110)
CHOLEST SERPL-MCNC: 191 MG/DL (ref 120–199)
CHOLEST/HDLC SERPL: 5.2 {RATIO} (ref 2–5)
CO2 SERPL-SCNC: 22 MMOL/L (ref 23–29)
COMPLEXED PSA SERPL-MCNC: 0.54 NG/ML (ref 0–4)
CREAT SERPL-MCNC: 1.2 MG/DL (ref 0.5–1.4)
ERYTHROCYTE [DISTWIDTH] IN BLOOD BY AUTOMATED COUNT: 13 % (ref 11.5–14.5)
EST. GFR  (NO RACE VARIABLE): >60 ML/MIN/1.73 M^2
ESTIMATED AVG GLUCOSE: 120 MG/DL (ref 68–131)
GLUCOSE SERPL-MCNC: 105 MG/DL (ref 70–110)
HBA1C MFR BLD: 5.8 % (ref 4–5.6)
HCT VFR BLD AUTO: 46.3 % (ref 40–54)
HDLC SERPL-MCNC: 37 MG/DL (ref 40–75)
HDLC SERPL: 19.4 % (ref 20–50)
HGB BLD-MCNC: 16 G/DL (ref 14–18)
LDLC SERPL CALC-MCNC: 133 MG/DL (ref 63–159)
MCH RBC QN AUTO: 31.2 PG (ref 27–31)
MCHC RBC AUTO-ENTMCNC: 34.6 G/DL (ref 32–36)
MCV RBC AUTO: 90 FL (ref 82–98)
NONHDLC SERPL-MCNC: 154 MG/DL
OHS QRS DURATION: 98 MS
OHS QTC CALCULATION: 395 MS
PLATELET # BLD AUTO: 306 K/UL (ref 150–450)
PMV BLD AUTO: 9.7 FL (ref 9.2–12.9)
POTASSIUM SERPL-SCNC: 4.3 MMOL/L (ref 3.5–5.1)
PROT SERPL-MCNC: 7.3 G/DL (ref 6–8.4)
PTH-INTACT SERPL-MCNC: 180.9 PG/ML (ref 9–77)
RBC # BLD AUTO: 5.13 M/UL (ref 4.6–6.2)
SODIUM SERPL-SCNC: 139 MMOL/L (ref 136–145)
TRIGL SERPL-MCNC: 105 MG/DL (ref 30–150)
TSH SERPL DL<=0.005 MIU/L-ACNC: 1.04 UIU/ML (ref 0.4–4)
WBC # BLD AUTO: 3.01 K/UL (ref 3.9–12.7)

## 2024-08-07 PROCEDURE — 1159F MED LIST DOCD IN RCRD: CPT | Mod: CPTII,S$GLB,, | Performed by: FAMILY MEDICINE

## 2024-08-07 PROCEDURE — 99396 PREV VISIT EST AGE 40-64: CPT | Mod: S$GLB,,, | Performed by: FAMILY MEDICINE

## 2024-08-07 PROCEDURE — 80061 LIPID PANEL: CPT | Performed by: FAMILY MEDICINE

## 2024-08-07 PROCEDURE — 84153 ASSAY OF PSA TOTAL: CPT | Performed by: FAMILY MEDICINE

## 2024-08-07 PROCEDURE — 83036 HEMOGLOBIN GLYCOSYLATED A1C: CPT | Performed by: FAMILY MEDICINE

## 2024-08-07 PROCEDURE — 3079F DIAST BP 80-89 MM HG: CPT | Mod: CPTII,S$GLB,, | Performed by: FAMILY MEDICINE

## 2024-08-07 PROCEDURE — 1160F RVW MEDS BY RX/DR IN RCRD: CPT | Mod: CPTII,S$GLB,, | Performed by: FAMILY MEDICINE

## 2024-08-07 PROCEDURE — 85027 COMPLETE CBC AUTOMATED: CPT | Performed by: FAMILY MEDICINE

## 2024-08-07 PROCEDURE — 82040 ASSAY OF SERUM ALBUMIN: CPT | Performed by: FAMILY MEDICINE

## 2024-08-07 PROCEDURE — 84443 ASSAY THYROID STIM HORMONE: CPT | Performed by: FAMILY MEDICINE

## 2024-08-07 PROCEDURE — 93010 ELECTROCARDIOGRAM REPORT: CPT | Mod: ,,, | Performed by: INTERNAL MEDICINE

## 2024-08-07 PROCEDURE — 84403 ASSAY OF TOTAL TESTOSTERONE: CPT | Performed by: FAMILY MEDICINE

## 2024-08-07 PROCEDURE — 3074F SYST BP LT 130 MM HG: CPT | Mod: CPTII,S$GLB,, | Performed by: FAMILY MEDICINE

## 2024-08-07 PROCEDURE — 4010F ACE/ARB THERAPY RXD/TAKEN: CPT | Mod: CPTII,S$GLB,, | Performed by: FAMILY MEDICINE

## 2024-08-07 PROCEDURE — 99999 PR PBB SHADOW E&M-EST. PATIENT-LVL IV: CPT | Mod: PBBFAC,,, | Performed by: FAMILY MEDICINE

## 2024-08-07 PROCEDURE — 83970 ASSAY OF PARATHORMONE: CPT | Performed by: FAMILY MEDICINE

## 2024-08-07 PROCEDURE — 3008F BODY MASS INDEX DOCD: CPT | Mod: CPTII,S$GLB,, | Performed by: FAMILY MEDICINE

## 2024-08-07 PROCEDURE — 80053 COMPREHEN METABOLIC PANEL: CPT | Performed by: FAMILY MEDICINE

## 2024-08-07 PROCEDURE — 84270 ASSAY OF SEX HORMONE GLOBUL: CPT | Performed by: FAMILY MEDICINE

## 2024-08-07 PROCEDURE — 93005 ELECTROCARDIOGRAM TRACING: CPT

## 2024-08-07 RX ORDER — TADALAFIL 5 MG/1
5 TABLET ORAL
COMMUNITY
Start: 2024-07-10

## 2024-08-19 DIAGNOSIS — I10 PRIMARY HYPERTENSION: ICD-10-CM

## 2024-08-19 RX ORDER — OLMESARTAN MEDOXOMIL 20 MG/1
20 TABLET ORAL DAILY
Qty: 30 TABLET | Refills: 5 | Status: CANCELLED | OUTPATIENT
Start: 2024-08-19 | End: 2025-02-15

## 2024-08-22 DIAGNOSIS — R94.31 NONSPECIFIC ABNORMAL ELECTROCARDIOGRAM (ECG) (EKG): Primary | ICD-10-CM

## 2024-08-22 DIAGNOSIS — E21.0 PRIMARY HYPERPARATHYROIDISM: ICD-10-CM

## 2024-11-29 DIAGNOSIS — I10 PRIMARY HYPERTENSION: Primary | ICD-10-CM

## 2024-11-29 DIAGNOSIS — Z00.00 ROUTINE HEALTH MAINTENANCE: ICD-10-CM

## 2024-12-03 ENCOUNTER — OFFICE VISIT (OUTPATIENT)
Dept: CARDIOLOGY | Facility: CLINIC | Age: 44
End: 2024-12-03
Payer: COMMERCIAL

## 2024-12-03 ENCOUNTER — TELEPHONE (OUTPATIENT)
Dept: CARDIOLOGY | Facility: CLINIC | Age: 44
End: 2024-12-03
Payer: COMMERCIAL

## 2024-12-03 DIAGNOSIS — I10 PRIMARY HYPERTENSION: Primary | ICD-10-CM

## 2024-12-03 DIAGNOSIS — R94.31 NONSPECIFIC ABNORMAL ELECTROCARDIOGRAM (ECG) (EKG): ICD-10-CM

## 2024-12-03 DIAGNOSIS — E78.2 MIXED HYPERLIPIDEMIA: ICD-10-CM

## 2024-12-03 PROCEDURE — 4010F ACE/ARB THERAPY RXD/TAKEN: CPT | Mod: CPTII,95,, | Performed by: INTERNAL MEDICINE

## 2024-12-03 PROCEDURE — 1159F MED LIST DOCD IN RCRD: CPT | Mod: CPTII,95,, | Performed by: INTERNAL MEDICINE

## 2024-12-03 PROCEDURE — 1160F RVW MEDS BY RX/DR IN RCRD: CPT | Mod: CPTII,95,, | Performed by: INTERNAL MEDICINE

## 2024-12-03 PROCEDURE — 3044F HG A1C LEVEL LT 7.0%: CPT | Mod: CPTII,95,, | Performed by: INTERNAL MEDICINE

## 2024-12-03 PROCEDURE — 99214 OFFICE O/P EST MOD 30 MIN: CPT | Mod: 95,,, | Performed by: INTERNAL MEDICINE

## 2024-12-03 NOTE — TELEPHONE ENCOUNTER
Spoke with pt in regards to rescheduling appointment to a virtual                    ----- Message from Moshe sent at 12/3/2024 12:43 PM CST -----  Regarding: Patient Callback Request  Contact: Pt [phone]  [email or web address]     Patient is calling for Medical Advice regarding: Patient wanted to change apt to a virtual but was not sure if he could due to therre being an EKG order. He said that he had an EKG in Aug 2024 and did not think he needed one. Please call patient to discuss options.    How long has patient had these symptoms:    Pharmacy name and phone#:    Patient wants a call back or thru [facility]: Call    Comments:    Please advise patient replies from provider may take up to 48 hours.

## 2024-12-03 NOTE — PROGRESS NOTES
Subjective:   Patient ID:  Foster Hickman is a 44 y.o. male who presents for follow up of No chief complaint on file.      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 telenote  Works at plant  Recent wellness exam, EKG NSR bradycardia 1st AVB and incomplete RBBB. Poor r progression in precordial leads.   No chest pain dyspnea dizziness faint and leg swelling.  Exercise 3 to 4 days a week. 2 to 3 hours a day  No smoking/drinking  No father had stroke at late 50's     11/22 visit tele visit  SBP variated 130 to 150 mmHG  ETT showed METS 10 and weak BP up to 230 mmHG. No ischemia induced  Reviewed ETT result with pt. ECHO not done yet    10/23 visit tele note  BP controlled at home. Med compliance regular exercise  He denied chest pain, dyspnea on exertion, palpitation, fainting, PND, orthopnea, syncope and claudication.   On healthy diet and weight control  HCTZ caused high calicum. Off few days and caused headache. Remains on HCTZ    The 10-year ASCVD risk score (Saida DK, et al., 2019) is: 6.8%    Values used to calculate the score:      Age: 43 years      Sex: Male      Is Non- : Yes      Diabetic: No      Tobacco smoker: No      Systolic Blood Pressure: 128 mmHg      Is BP treated: Yes      HDL Cholesterol: 45 mg/dL      Total Cholesterol: 257 mg/dL      Interval history tele  BP controlled at home. On telmisartan  Denied chest pain, dyspnea on exertion, palpitation, fainting, PND, orthopnea, syncope and claudication.   BP C     No smoking drinking.                     Past Medical History:   Diagnosis Date    Migraine        Past Surgical History:   Procedure Laterality Date    ELBOW SURGERY Right     bone spur    VASECTOMY         Social History     Tobacco Use    Smoking status: Never    Smokeless tobacco: Never   Substance Use Topics    Alcohol use: Yes     Comment: occ    Drug use: No       Family History   Problem Relation Name Age of Onset    Hypertension Mother      Hypertension  "Father      Heart disease Father      Diabetes Paternal Grandmother      No Known Problems Son      No Known Problems Son      No Known Problems Daughter      Cancer Neg Hx           ROS    Objective:   Physical Exam    Lab Results   Component Value Date    CHOL 191 08/07/2024    CHOL 180 12/28/2023    CHOL 257 (H) 09/28/2023     Lab Results   Component Value Date    HDL 37 (L) 08/07/2024    HDL 42 12/28/2023    HDL 45 09/28/2023     Lab Results   Component Value Date    LDLCALC 133.0 08/07/2024    LDLCALC 119.6 12/28/2023    LDLCALC 188.6 (H) 09/28/2023     Lab Results   Component Value Date    TRIG 105 08/07/2024    TRIG 92 12/28/2023    TRIG 117 09/28/2023     Lab Results   Component Value Date    CHOLHDL 19.4 (L) 08/07/2024    CHOLHDL 23.3 12/28/2023    CHOLHDL 17.5 (L) 09/28/2023       Chemistry        Component Value Date/Time     08/07/2024 0935    K 4.3 08/07/2024 0935     08/07/2024 0935    CO2 22 (L) 08/07/2024 0935    BUN 12 08/07/2024 0935    CREATININE 1.2 08/07/2024 0935     08/07/2024 0935        Component Value Date/Time    CALCIUM 11.5 (H) 08/07/2024 0935    ALKPHOS 128 08/07/2024 0935    AST 30 08/07/2024 0935    ALT 50 (H) 08/07/2024 0935    BILITOT 0.5 08/07/2024 0935    ESTGFRAFRICA >60 10/29/2021 0731    EGFRNONAA >60 10/29/2021 0731          Lab Results   Component Value Date    HGBA1C 5.8 (H) 08/07/2024     Lab Results   Component Value Date    TSH 1.040 08/07/2024     No results found for: "INR", "PROTIME"  Lab Results   Component Value Date    WBC 3.01 (L) 08/07/2024    HGB 16.0 08/07/2024    HCT 46.3 08/07/2024    MCV 90 08/07/2024     08/07/2024     BMP  Sodium   Date Value Ref Range Status   08/07/2024 139 136 - 145 mmol/L Final     Potassium   Date Value Ref Range Status   08/07/2024 4.3 3.5 - 5.1 mmol/L Final     Chloride   Date Value Ref Range Status   08/07/2024 110 95 - 110 mmol/L Final     CO2   Date Value Ref Range Status   08/07/2024 22 (L) 23 - 29 " mmol/L Final     BUN   Date Value Ref Range Status   08/07/2024 12 6 - 20 mg/dL Final     Creatinine   Date Value Ref Range Status   08/07/2024 1.2 0.5 - 1.4 mg/dL Final     Calcium   Date Value Ref Range Status   08/07/2024 11.5 (H) 8.7 - 10.5 mg/dL Final     Anion Gap   Date Value Ref Range Status   08/07/2024 7 (L) 8 - 16 mmol/L Final     eGFR if    Date Value Ref Range Status   10/29/2021 >60 >60 mL/min/1.73 m^2 Final     eGFR if non    Date Value Ref Range Status   10/29/2021 >60 >60 mL/min/1.73 m^2 Final     Comment:     Calculation used to obtain the estimated glomerular filtration  rate (eGFR) is the CKD-EPI equation.        BNP  @LABRCNTIP(BNP,BNPTRIAGEBLO)@  @LABRCNTIP(troponini)@  CrCl cannot be calculated (Patient's most recent lab result is older than the maximum 7 days allowed.).  No results found in the last 24 hours.  No results found in the last 24 hours.  No results found in the last 24 hours.    Assessment:      1. Primary hypertension    2. Nonspecific abnormal electrocardiogram (ECG) (EKG)    3. Mixed hyperlipidemia        Plan:   Primary hypertension    Nonspecific abnormal electrocardiogram (ECG) (EKG)  -     Ambulatory referral/consult to Cardiology    Mixed hyperlipidemia            Continue telmisartan and statin  DASH  RTC in 1`yr    The patient location is: home   The chief complaint leading to consultation is: HTN and HLD    Visit type: audiovisual    Face to Face time with patient: 15 minutes of total time spent on the encounter, which includes face to face time and non-face to face time preparing to see the patient (eg, review of tests), Obtaining and/or reviewing separately obtained history, Documenting clinical information in the electronic or other health record, Independently interpreting results (not separately reported) and communicating results to the patient/family/caregiver, or Care coordination (not separately reported).         Each patient to  whom he or she provides medical services by telemedicine is:  (1) informed of the relationship between the physician and patient and the respective role of any other health care provider with respect to management of the patient; and (2) notified that he or she may decline to receive medical services by telemedicine and may withdraw from such care at any time.    Notes:

## 2025-03-14 LAB — CRC RECOMMENDATION EXT: NORMAL

## 2025-03-18 ENCOUNTER — PATIENT OUTREACH (OUTPATIENT)
Dept: ADMINISTRATIVE | Facility: HOSPITAL | Age: 45
End: 2025-03-18
Payer: COMMERCIAL

## 2025-03-18 NOTE — LETTER
[id number]    AUTHORIZATION FOR RELEASE OF   CONFIDENTIAL INFORMATION    Dear Dr. Morgan,    We are seeing Foster Hickman, date of birth 1980, in the clinic at [facility]. Norma Moore MD is the patient's PCP. Foster Hickman has an outstanding lab/procedure at the time we reviewed his chart. In order to help keep his health information updated, he has authorized us to request the following medical record(s):            ( x ) 3/14/2025 COLON PATHOLOGY REPORT           Please fax/email records to:  Fax #: [phone]  Email: [email or web address]     If you have any questions, please contact    SANA Tolbert @ [phone]          Patient Name: Foster Hickman  : 1980  Patient Phone #: [phone]

## 2025-03-18 NOTE — PROGRESS NOTES
Manually uploaded and hyper-linked COLONOSCOPY 3/14/2025  NATALIE faxed to Dr. Eddie lema to request colon pathology. Reminder set.

## 2025-06-02 RX ORDER — ATORVASTATIN CALCIUM 20 MG/1
20 TABLET, FILM COATED ORAL NIGHTLY
Qty: 90 TABLET | Refills: 2 | Status: SHIPPED | OUTPATIENT
Start: 2025-06-02

## 2025-07-18 ENCOUNTER — OFFICE VISIT (OUTPATIENT)
Dept: FAMILY MEDICINE | Facility: CLINIC | Age: 45
End: 2025-07-18
Payer: COMMERCIAL

## 2025-07-18 DIAGNOSIS — I10 PRIMARY HYPERTENSION: Primary | ICD-10-CM

## 2025-07-18 DIAGNOSIS — E78.2 MIXED HYPERLIPIDEMIA: ICD-10-CM

## 2025-07-18 DIAGNOSIS — Z76.0 ENCOUNTER FOR MEDICATION REFILL: ICD-10-CM

## 2025-07-18 DIAGNOSIS — K21.9 GASTROESOPHAGEAL REFLUX DISEASE, UNSPECIFIED WHETHER ESOPHAGITIS PRESENT: ICD-10-CM

## 2025-07-18 RX ORDER — OLMESARTAN MEDOXOMIL 20 MG/1
20 TABLET ORAL DAILY
Qty: 30 TABLET | Refills: 0 | Status: SHIPPED | OUTPATIENT
Start: 2025-07-18

## 2025-07-18 RX ORDER — PANTOPRAZOLE SODIUM 40 MG/1
40 TABLET, DELAYED RELEASE ORAL DAILY
Qty: 90 TABLET | Refills: 3 | Status: SHIPPED | OUTPATIENT
Start: 2025-07-18 | End: 2026-07-18

## 2025-07-18 NOTE — PROGRESS NOTES
Foster Hickman  MRN:  [id number]  45 y.o. male      PRIMARY CARE TELEMEDICINE NOTE    Patient location:  [place]  Visit type: Virtual visit with synchronous audio and video  Each patient to whom he or she provides medical services by telemedicine is:  (1) informed of the relationship between the physician and patient and the respective role of any other health care provider with respect to management of the patient  (2) notified that he or she may decline to receive medical services by telemedicine and may withdraw from such care at any time      SUBJECTIVE:   '  CHIEF COMPLAINT:     med refill    HPI:    Foster Hickman is seen virtually today for refill on medications.  He has PMHx of HTN and HLD. He continues Olmesartan for blood pressure management, Atorvastatin for cholesterol control, and Pantoprazole for acid reflux management. He requires medication refills for Olmesartan and Pantoprazole. He is due for his annual physical as he has not been to office in about 1 year. He states he works rotating shifts which has impacted his ability to schedule regular medical follow-up. He plans to complete his annual workplace physical exam.    REVIEW OF SYSTEMS:  Review of Systems   Respiratory:  Negative for shortness of breath.    Cardiovascular:  Negative for chest pain and palpitations.   Gastrointestinal:         Admits to reflux.    Musculoskeletal:  Negative for neck pain.   Neurological:  Negative for dizziness and headaches.          CURRENT ALLERGY LIST:  Review of patient's allergies indicates:  No Known Allergies    CURRENT PROBLEM LIST:  Problem List[1]    CURRENT MEDICATIONS:  Current Medications[2]      Past medical, surgical, family and social histories have been reviewed today.      OBJECTIVE:     EXAM:  Constitutional:  In NAD, pleasant, cooperative.  Appears well-developed and well-nourished.   Respiratory:  Unlabored, in no resp distress.  Neurological: Alert and oriented to person, place, and time.  "  Psychiatric: Normal mood and affect, behavior is normal. Judgment and thought content normal.     Complete PE deferred due to video visit        ASSESSMENT:     1. Primary hypertension    -     olmesartan (BENICAR) 20 MG tablet; Take 1 tablet (20 mg total) by mouth once daily.  Dispense: 30 tablet; Refill: 0   - chronic, stable on olmesartan per PCP  2. Gastroesophageal reflux disease, unspecified whether esophagitis present    -     pantoprazole (PROTONIX) 40 MG tablet; Take 1 tablet (40 mg total) by mouth once daily.  Dispense: 90 tablet; Refill: 3    3. Encounter for medication refill  -     pantoprazole (PROTONIX) 40 MG tablet; Take 1 tablet (40 mg total) by mouth once daily.  Dispense: 90 tablet; Refill: 3    -     olmesartan (BENICAR) 20 MG tablet; Take 1 tablet (20 mg total) by mouth once daily.  Dispense: 30 tablet; Refill: 0    4. Mixed hyperlipidemia   - stable on statin per PCP            PLAN:     Meds refilled for 30 day supply  Patient advised to schedule annual within this month. Physical exam, screenings and labs needed for health maintenance.   Low sodium, low fat diet  Continue all other present management  Follow up as needed    16 minutes of total time spent on the encounter, which includes "face to face" (virtual) time and non-face to face time preparing to see the patient .  This includes review of tests, obtaining and/or reviewing separately obtained history, and documenting clinical information in the electronic or other health record.  Also includes independent interpretation of results (not separately reported) and communicating results to the patient/family/caregiver, with care coordination (not separately reported).       Katarzyna Amador, MSN, APRN, FNP-C                         [1]   Patient Active Problem List  Diagnosis    Migraine without status migrainosus, not intractable    Primary hypertension    Mixed hyperlipidemia    Primary hyperparathyroidism   [2]   Current Outpatient " Medications:     atorvastatin (LIPITOR) 20 MG tablet, TAKE 1 TABLET BY MOUTH EVERY DAY IN THE EVENING, Disp: 90 tablet, Rfl: 2    eletriptan (RELPAX) 40 MG tablet, TAKE 1 TABLET BY MOUTH AT ONSET OF MIGRAINE. MAY REPEAT IN 2 HOURS IF NEEDED. MAX 2 TABLET PER DAY, Disp: 27 tablet, Rfl: 0    olmesartan (BENICAR) 20 MG tablet, Take 1 tablet (20 mg total) by mouth once daily., Disp: 30 tablet, Rfl: 0    pantoprazole (PROTONIX) 40 MG tablet, Take 1 tablet (40 mg total) by mouth once daily., Disp: 90 tablet, Rfl: 3    tadalafiL (CIALIS) 5 MG tablet, Take 5 mg by mouth., Disp: , Rfl:

## 2025-07-23 ENCOUNTER — TELEPHONE (OUTPATIENT)
Dept: FAMILY MEDICINE | Facility: CLINIC | Age: 45
End: 2025-07-23
Payer: COMMERCIAL

## 2025-07-23 DIAGNOSIS — I10 PRIMARY HYPERTENSION: ICD-10-CM

## 2025-07-24 RX ORDER — OLMESARTAN MEDOXOMIL 20 MG/1
20 TABLET ORAL DAILY
Qty: 90 TABLET | Refills: 0 | Status: SHIPPED | OUTPATIENT
Start: 2025-07-24

## 2025-07-24 NOTE — TELEPHONE ENCOUNTER
Advise pt - okay for 90-day refill; however, patient will need to see me this year for physical/follow up in September or October 2025 as I have not seen him since 5/3/2023. Thanks.    I have put the following orders and/or medications to this note.  Please advise pt and assist.    No orders of the defined types were placed in this encounter.      Medications Ordered This Encounter   Medications    olmesartan (BENICAR) 20 MG tablet     Sig: Take 1 tablet (20 mg total) by mouth once daily.     Dispense:  90 tablet     Refill:  0     
Advised pt and scheduled an annual visit.  
Copied from CRM #[id number]. Topic: General Inquiry - Patient Advice  >> Jul 23, 2025  3:15 PM Maggie wrote:  Patient called to consult with nurse or staff regarding his medication olmesartan (BENICAR) 20 MG tablet. He stated his pharmacy will not fill the medication unless it is written for a 90 day supply, per the insurance. Patient is requesting this prescription as he had to pay out of pocket for a 10 day supply. He would like this fill sent to   [facility]/pharmacy #[id number] [place], [place] - [address] [address]  Phone: [phone] Fax: [phone]  Patient request a call back regarding this and can be reached at [phone]. Thanks/MR  
Note sent to provider for advise  
no

## 2025-07-25 DIAGNOSIS — Z00.00 ROUTINE GENERAL MEDICAL EXAMINATION AT A HEALTH CARE FACILITY: Primary | ICD-10-CM

## 2025-08-05 ENCOUNTER — HOSPITAL ENCOUNTER (OUTPATIENT)
Dept: CARDIOLOGY | Facility: HOSPITAL | Age: 45
Discharge: HOME | End: 2025-08-05
Attending: FAMILY MEDICINE
Payer: COMMERCIAL

## 2025-08-05 ENCOUNTER — OFFICE VISIT (OUTPATIENT)
Dept: INTERNAL MEDICINE | Facility: CLINIC | Age: 45
End: 2025-08-05
Payer: COMMERCIAL

## 2025-08-05 ENCOUNTER — CLINICAL SUPPORT (OUTPATIENT)
Dept: INTERNAL MEDICINE | Facility: CLINIC | Age: 45
End: 2025-08-05
Payer: COMMERCIAL

## 2025-08-05 ENCOUNTER — HOSPITAL ENCOUNTER (OUTPATIENT)
Dept: RADIOLOGY | Facility: HOSPITAL | Age: 45
Discharge: HOME | End: 2025-08-05
Attending: FAMILY MEDICINE
Payer: COMMERCIAL

## 2025-08-05 VITALS
TEMPERATURE: 97 F | HEIGHT: 74 IN | SYSTOLIC BLOOD PRESSURE: 137 MMHG | DIASTOLIC BLOOD PRESSURE: 89 MMHG | BODY MASS INDEX: 31.57 KG/M2 | RESPIRATION RATE: 18 BRPM | WEIGHT: 246 LBS | HEART RATE: 51 BPM | OXYGEN SATURATION: 99 %

## 2025-08-05 DIAGNOSIS — D70.9 NEUTROPENIA, UNSPECIFIED TYPE: ICD-10-CM

## 2025-08-05 DIAGNOSIS — G43.109 MIGRAINE WITH AURA AND WITHOUT STATUS MIGRAINOSUS, NOT INTRACTABLE: Chronic | ICD-10-CM

## 2025-08-05 DIAGNOSIS — R00.1 BRADYCARDIA: Chronic | ICD-10-CM

## 2025-08-05 DIAGNOSIS — E21.0 PRIMARY HYPERPARATHYROIDISM: Primary | Chronic | ICD-10-CM

## 2025-08-05 DIAGNOSIS — Z67.A1 BENIGN ETHNIC NEUTROPENIA: Chronic | ICD-10-CM

## 2025-08-05 DIAGNOSIS — I10 PRIMARY HYPERTENSION: Chronic | ICD-10-CM

## 2025-08-05 DIAGNOSIS — G89.29 CHRONIC PAIN OF LEFT KNEE: ICD-10-CM

## 2025-08-05 DIAGNOSIS — M25.562 CHRONIC PAIN OF LEFT KNEE: ICD-10-CM

## 2025-08-05 DIAGNOSIS — E78.2 MIXED HYPERLIPIDEMIA: Chronic | ICD-10-CM

## 2025-08-05 DIAGNOSIS — Z00.00 ROUTINE GENERAL MEDICAL EXAMINATION AT A HEALTH CARE FACILITY: ICD-10-CM

## 2025-08-05 DIAGNOSIS — Z00.00 ROUTINE GENERAL MEDICAL EXAMINATION AT A HEALTH CARE FACILITY: Primary | ICD-10-CM

## 2025-08-05 DIAGNOSIS — R73.03 PREDIABETES: ICD-10-CM

## 2025-08-05 PROBLEM — G43.909 MIGRAINE WITHOUT STATUS MIGRAINOSUS, NOT INTRACTABLE: Chronic | Status: ACTIVE | Noted: 2021-03-25

## 2025-08-05 LAB
ABSOLUTE EOSINOPHIL (OHS): 0.07 K/UL
ABSOLUTE MONOCYTE (OHS): 0.39 K/UL (ref 0.3–1)
ABSOLUTE NEUTROPHIL COUNT (OHS): 1.52 K/UL (ref 1.8–7.7)
ALBUMIN SERPL BCP-MCNC: 4.3 G/DL (ref 3.5–5.2)
ALP SERPL-CCNC: 155 UNIT/L (ref 40–150)
ALT SERPL W/O P-5'-P-CCNC: 30 UNIT/L (ref 10–44)
ANION GAP (OHS): 8 MMOL/L (ref 8–16)
ANTIGEN TYPING RBC: NORMAL
AST SERPL-CCNC: 26 UNIT/L (ref 11–45)
BASOPHILS # BLD AUTO: 0.03 K/UL
BASOPHILS NFR BLD AUTO: 1 %
BILIRUB SERPL-MCNC: 0.4 MG/DL (ref 0.1–1)
BUN SERPL-MCNC: 14 MG/DL (ref 6–20)
CALCIUM SERPL-MCNC: 10.2 MG/DL (ref 8.7–10.5)
CHLORIDE SERPL-SCNC: 110 MMOL/L (ref 95–110)
CHOLEST SERPL-MCNC: 195 MG/DL (ref 120–199)
CHOLEST/HDLC SERPL: 5.1 {RATIO} (ref 2–5)
CO2 SERPL-SCNC: 24 MMOL/L (ref 23–29)
CREAT SERPL-MCNC: 1.1 MG/DL (ref 0.5–1.4)
EAG (OHS): 120 MG/DL (ref 68–131)
ERYTHROCYTE [DISTWIDTH] IN BLOOD BY AUTOMATED COUNT: 13.2 % (ref 11.5–14.5)
ERYTHROCYTE [DISTWIDTH] IN BLOOD BY AUTOMATED COUNT: 13.3 % (ref 11.5–14.5)
GFR SERPLBLD CREATININE-BSD FMLA CKD-EPI: >60 ML/MIN/1.73/M2
GLUCOSE SERPL-MCNC: 95 MG/DL (ref 70–110)
HBA1C MFR BLD: 5.8 % (ref 4–5.6)
HCT VFR BLD AUTO: 44.3 % (ref 40–54)
HCT VFR BLD AUTO: 44.8 % (ref 40–54)
HDLC SERPL-MCNC: 38 MG/DL (ref 40–75)
HDLC SERPL: 19.5 % (ref 20–50)
HGB BLD-MCNC: 15.1 GM/DL (ref 14–18)
HGB BLD-MCNC: 15.2 GM/DL (ref 14–18)
HOLD SPECIMEN: NORMAL
IMM GRANULOCYTES # BLD AUTO: 0.01 K/UL (ref 0–0.04)
IMM GRANULOCYTES NFR BLD AUTO: 0.3 % (ref 0–0.5)
LDLC SERPL CALC-MCNC: 130.4 MG/DL (ref 63–159)
LYMPHOCYTES # BLD AUTO: 1.03 K/UL (ref 1–4.8)
MCH RBC QN AUTO: 31.1 PG (ref 27–31)
MCH RBC QN AUTO: 31.2 PG (ref 27–31)
MCHC RBC AUTO-ENTMCNC: 33.7 G/DL (ref 32–36)
MCHC RBC AUTO-ENTMCNC: 34.3 G/DL (ref 32–36)
MCV RBC AUTO: 91 FL (ref 82–98)
MCV RBC AUTO: 92 FL (ref 82–98)
NONHDLC SERPL-MCNC: 157 MG/DL
NUCLEATED RBC (/100WBC) (OHS): 0 /100 WBC
OHS QRS DURATION: 98 MS
OHS QTC CALCULATION: 393 MS
PLATELET # BLD AUTO: 296 K/UL (ref 150–450)
PLATELET # BLD AUTO: 307 K/UL (ref 150–450)
PMV BLD AUTO: 10.1 FL (ref 9.2–12.9)
PMV BLD AUTO: 11 FL (ref 9.2–12.9)
POTASSIUM SERPL-SCNC: 4.4 MMOL/L (ref 3.5–5.1)
PROT SERPL-MCNC: 7.2 GM/DL (ref 6–8.4)
PSA SERPL-MCNC: 0.59 NG/ML
RBC # BLD AUTO: 4.85 M/UL (ref 4.6–6.2)
RBC # BLD AUTO: 4.87 M/UL (ref 4.6–6.2)
RELATIVE EOSINOPHIL (OHS): 2.3 %
RELATIVE LYMPHOCYTE (OHS): 33.8 % (ref 18–48)
RELATIVE MONOCYTE (OHS): 12.8 % (ref 4–15)
RELATIVE NEUTROPHIL (OHS): 49.8 % (ref 38–73)
SODIUM SERPL-SCNC: 142 MMOL/L (ref 136–145)
TRIGL SERPL-MCNC: 133 MG/DL (ref 30–150)
TSH SERPL-ACNC: 2.02 UIU/ML (ref 0.4–4)
WBC # BLD AUTO: 3.05 K/UL (ref 3.9–12.7)
WBC # BLD AUTO: 3.1 K/UL (ref 3.9–12.7)

## 2025-08-05 PROCEDURE — 84153 ASSAY OF PSA TOTAL: CPT

## 2025-08-05 PROCEDURE — 83036 HEMOGLOBIN GLYCOSYLATED A1C: CPT

## 2025-08-05 PROCEDURE — 1159F MED LIST DOCD IN RCRD: CPT | Mod: CPTII,S$GLB,, | Performed by: FAMILY MEDICINE

## 2025-08-05 PROCEDURE — 84443 ASSAY THYROID STIM HORMONE: CPT

## 2025-08-05 PROCEDURE — 99396 PREV VISIT EST AGE 40-64: CPT | Mod: S$GLB,,, | Performed by: FAMILY MEDICINE

## 2025-08-05 PROCEDURE — 3079F DIAST BP 80-89 MM HG: CPT | Mod: CPTII,S$GLB,, | Performed by: FAMILY MEDICINE

## 2025-08-05 PROCEDURE — 73562 X-RAY EXAM OF KNEE 3: CPT | Mod: TC,RT

## 2025-08-05 PROCEDURE — 4010F ACE/ARB THERAPY RXD/TAKEN: CPT | Mod: CPTII,S$GLB,, | Performed by: FAMILY MEDICINE

## 2025-08-05 PROCEDURE — 3075F SYST BP GE 130 - 139MM HG: CPT | Mod: CPTII,S$GLB,, | Performed by: FAMILY MEDICINE

## 2025-08-05 PROCEDURE — 93010 ELECTROCARDIOGRAM REPORT: CPT | Mod: ,,, | Performed by: INTERNAL MEDICINE

## 2025-08-05 PROCEDURE — 80061 LIPID PANEL: CPT

## 2025-08-05 PROCEDURE — 85027 COMPLETE CBC AUTOMATED: CPT

## 2025-08-05 PROCEDURE — 80053 COMPREHEN METABOLIC PANEL: CPT

## 2025-08-05 PROCEDURE — 99999 PR PBB SHADOW E&M-EST. PATIENT-LVL IV: CPT | Mod: PBBFAC,,, | Performed by: FAMILY MEDICINE

## 2025-08-05 PROCEDURE — 93005 ELECTROCARDIOGRAM TRACING: CPT

## 2025-08-05 PROCEDURE — 73562 X-RAY EXAM OF KNEE 3: CPT | Mod: 26,RT,, | Performed by: RADIOLOGY

## 2025-08-05 PROCEDURE — 85025 COMPLETE CBC W/AUTO DIFF WBC: CPT | Performed by: FAMILY MEDICINE

## 2025-08-05 PROCEDURE — 86905 BLOOD TYPING RBC ANTIGENS: CPT | Mod: 91 | Performed by: FAMILY MEDICINE

## 2025-08-05 PROCEDURE — 3044F HG A1C LEVEL LT 7.0%: CPT | Mod: CPTII,S$GLB,, | Performed by: FAMILY MEDICINE

## 2025-08-05 PROCEDURE — 73564 X-RAY EXAM KNEE 4 OR MORE: CPT | Mod: 26,LT,, | Performed by: RADIOLOGY

## 2025-08-05 PROCEDURE — 3008F BODY MASS INDEX DOCD: CPT | Mod: CPTII,S$GLB,, | Performed by: FAMILY MEDICINE

## 2025-08-05 RX ORDER — CINACALCET 30 MG/1
30 TABLET, FILM COATED ORAL
COMMUNITY
Start: 2025-07-03

## 2025-08-05 RX ORDER — ERGOCALCIFEROL 1.25 MG/1
50000 CAPSULE ORAL
COMMUNITY

## 2025-08-05 NOTE — ASSESSMENT & PLAN NOTE
Lab Results   Component Value Date    CHOL 191 08/07/2024    CHOL 180 12/28/2023    CHOL 257 (H) 09/28/2023    TRIG 105 08/07/2024    TRIG 92 12/28/2023    TRIG 117 09/28/2023    HDL 37 (L) 08/07/2024    HDL 42 12/28/2023    HDL 45 09/28/2023    LDLCALC 133.0 08/07/2024    LDLCALC 119.6 12/28/2023    LDLCALC 188.6 (H) 09/28/2023    NONHDLCHOL 154 08/07/2024    NONHDLCHOL 138 12/28/2023    NONHDLCHOL 212 09/28/2023    AST 30 08/07/2024    ALT 50 (H) 08/07/2024     The 10-year ASCVD risk score (Saida SANDOVAL, et al., 2019) is: 8.3%    Values used to calculate the score:      Age: 45 years      Sex: Male      Is Non- : Yes      Diabetic: No      Tobacco smoker: No      Systolic Blood Pressure: 137 mmHg      Is BP treated: Yes      HDL Cholesterol: 37 mg/dL      Total Cholesterol: 191 mg/dL

## 2025-08-05 NOTE — ASSESSMENT & PLAN NOTE
Lab Results   Component Value Date    PTH 85 (H) 04/29/2025    .3 (H) 04/07/2025    .6 (H) 03/25/2025    CALCIUM 11.3 (H) 04/12/2025    CALCIUM 11.2 (H) 04/10/2025    CALCIUM 11.5 (H) 08/07/2024    CAION 1.62 (H) 09/28/2023    ALBUMIN 4.3 08/07/2024    ALBUMIN 4.1 08/07/2024     Lab Results   Component Value Date    EGFRNORACEVR >60 08/07/2024    EGFRNORACEVR >60.0 12/28/2023    EGFRNORACEVR >60 09/28/2023    CREATININE 1.21 (H) 04/12/2025    CREATININE 1.03 04/10/2025    CREATININE 1.2 08/07/2024

## 2025-08-05 NOTE — LETTER
Dear Foster,    It was a real pleasure seeing you for your recent Executive Health Exam. I appreciate the opportunity to review your health in detail and to discuss your overall wellness. This letter provides you with a summary of your exam and test results, along with some recommendations for ongoing care.    At your exam, your blood pressure was 137/89. Ideally, blood pressure should be less than 130/80, so this is slightly above the target range, but not alarmingly high. Your pulse was 51, which is slow but consistent with your history of physiologic bradycardia, and this is not concerning given your activity level. Your weight was 246 pounds, which corresponds to a BMI of 31.6. A BMI between 30 and 34.9 falls in the obesity class I category, which increases long-term risk for conditions like heart disease and diabetes. Looking at your trends, your blood pressure has generally hovered in the borderline high range over the past couple of years. Your weight and BMI have increased somewhat since 2023, moving from just under 30 to now over 31. While these changes arent dramatic, I encourage you to focus on steady, achievable lifestyle habits to help reverse this upward trend. Its encouraging that your oxygen levels, temperature, and other vital signs are all normal.    You have never used tobacco, which is excellent and one of the best choices for your long-term health. Your alcohol intake is moderate at about three drinks per week, which falls within safe limits. Keep up this balanced approach, and continue to avoid excess drinking.    Your responses about social determinants of health are very reassuring. You reported low stress, adequate resources for food and housing, no difficulty with transportation, and strong financial stability. You also reported exercising four days per week for about 50 minutes each time, which is outstanding and plays a key role in keeping your heart, muscles, and mind healthy. Please  keep up this great work--it makes a big difference.    Your current medications include: Atorvastatin (Lipitor), Cinacalcet (Sensipar), Eletriptan (Relpax), Olmesartan (Benicar), Pantoprazole (Protonix), Tadalafil (Cialis), and Ergocalciferol (Vitamin D).    Now, lets review your lab results.    Antigen Typing RBC: This test was NEGATIVE. This result supports the diagnosis of benign ethnic neutropenia as the explanation for your mild neutropenia. This is a harmless condition seen in some populations, and it does not increase your risk of infection or require treatment.    Complete Blood Count (CBC): This test checks for anemia, infection, and blood disorders. Your red blood cell count, hemoglobin, hematocrit, and platelets are all normal. Your white blood cell count was mildly low at 3.05, with neutrophils also slightly low at 1.52. 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    Comprehensive Metabolic Panel (CMP): This measures kidney function, liver function, electrolytes, and blood sugar. All values were in the normal range except for alkaline phosphatase, which was just slightly above normal at 155. This mild elevation is not unusual and is not concerning by itself. Your kidney and liver function otherwise look good.    Lipid Panel: This measures cholesterol and triglycerides. Your total cholesterol (195), triglycerides (133), and LDL (130) were within acceptable limits. However, your HDL (good cholesterol) was low at 38, and your cholesterol-to-HDL ratio was slightly above the goal at 5.1. Low HDL increases cardiovascular risk, so regular exercise, weight loss, and limiting refined carbohydrates can help raise it.    Hemoglobin A1c: This measures average blood sugar over three months. Your result was 5.8, which is in the prediabetes range. This is stable compared to last year. Prediabetes means your blood sugar is higher than normal but not yet diabetes. Lifestyle  changes--especially healthy eating, exercise, and weight management--are the most important steps to help prevent progression.    Thyroid Stimulating Hormone (TSH): This test checks thyroid function, and your result was normal.    Prostate Specific Antigen (PSA): This screening test for prostate cancer was 0.59, well within the normal range.    Looking at your historical lab data, your A1c has remained stable at 5.8 over the past two years, which is reassuring, though still in the prediabetes range. Your cholesterol numbers have also been fairly steady, with LDL and total cholesterol showing consistency. Your liver enzymes actually improved compared to last year, which is a positive change.    Other test results:    Electrocardiogram (EKG): This test looks at the hearts electrical rhythm. Your EKG showed sinus bradycardia (slow but regular heart rate) and a first-degree AV block, which are often normal findings in people who exercise regularly. It also noted an incomplete right bundle branch block and an old possible anterior infarct, though this is nonspecific and may not be significant. Given your good exercise tolerance, these findings are likely not a problem, but I do recommend you review them with your primary care doctor for ongoing monitoring.    For health maintenance, your record shows you are up to date on vaccinations, colorectal cancer screening, and other preventive items. The only item listed as due is the updated COVID-19 vaccine, which was recommended for the 4097-2242 season. If you have already received this, please send us the documentation so we can update your chart. If not, I encourage you to get vaccinated. Since I am not your primary care physician, please note that your PCP, Dr. Moore, will have the most complete and up-to-date record of your preventive care.    To support your health going forward, I encourage you to focus on steady lifestyle improvements. Maintaining regular physical  activity, choosing a diet rich in vegetables, fruits, lean proteins, and whole grains, moderating alcohol, and keeping stress under control will all help. For you, particular areas of focus include managing weight, supporting heart health by improving cholesterol balance, and preventing diabetes progression. Even small changes--such as trimming portion sizes, reducing sugary drinks, and adding daily walking--can have long-term benefits.    I trust you will follow up with Dr. Moore for any new concerns, but you are always welcome to message me if you have questions about your executive health exam. Thank you for trusting me and [facility] with your care--it was a privilege to work with you.    Warmest regards,     MARGO Gonzalez MD

## 2025-08-05 NOTE — PROGRESS NOTES
"***DeepScribe<<<Awaiting Transcription>>>  Health Maintenance Due   Topic Date Due    COVID-19 Vaccine (4 - 2024-25 season) 09/01/2024       Pulse Readings from Last 5 Encounters:   08/05/25 (!) 51   08/07/24 (!) 57   09/28/23 61   09/20/23 76   05/03/23 91          Orders Placed This Encounter   Procedures    X-Ray Knee 3 View Left    CBC Auto Differential    CBC with Differential    Ambulatory referral/consult to Orthopedics    Blood typing, RBC antigens        COMMENTS: ***  EXECUTIVE HEALTH ENCOUNTER  8/5/25      REASON FOR VISIT  [facility]IVE [facility]    PCP (Primary Care Provider)  { [facility] PCP statement:01559}    HISTORY  ***  History of Present Illness            Assessment & Plan               { ROS Optional (Optional):04912::"Except as noted herein, ROS is otherwise negative."}    ASSESSMENT/PLAN  1. Primary hyperparathyroidism  Overview:  ENDOCRINOLOGIST: Dr. Rowley ([facility] [address])    Assessment & Plan:  Lab Results   Component Value Date    PTH 85 (H) 04/29/2025    .3 (H) 04/07/2025    .6 (H) 03/25/2025    CALCIUM 11.3 (H) 04/12/2025    CALCIUM 11.2 (H) 04/10/2025    CALCIUM 11.5 (H) 08/07/2024    CAION 1.62 (H) 09/28/2023    ALBUMIN 4.3 08/07/2024    ALBUMIN 4.1 08/07/2024     Lab Results   Component Value Date    EGFRNORACEVR >60 08/07/2024    EGFRNORACEVR >60.0 12/28/2023    EGFRNORACEVR >60 09/28/2023    CREATININE 1.21 (H) 04/12/2025    CREATININE 1.03 04/10/2025    CREATININE 1.2 08/07/2024         2. Primary hypertension  Assessment & Plan:  BP Readings from Last 6 Encounters:   08/05/25 137/89   08/07/24 123/80   09/28/23 128/84   09/20/23 138/84   05/03/23 116/84   10/11/22 (!) 140/92      Last 5 Patient Entered Readings                Current 30 Day Average: 133/78  Recent Readings 8/1/2025 8/1/2025 7/25/2025 7/25/2025 7/18/2025   SBP (mmHg) 118 139 136 135 130   DBP (mmHg) 72 77 74 77 72   Pulse 71 74 65 66 82                 Lab Results   Component Value Date    EGFRNORACEVR " >60 08/07/2024    CREATININE 1.21 (H) 04/12/2025    BUN 15 04/12/2025    K 4.3 04/12/2025     (L) 04/12/2025     04/12/2025     Results for orders placed or performed during the hospital encounter of 08/05/25   EKG 12-lead    Collection Time: 08/05/25  9:11 AM   Result Value Ref Range    QRS Duration 98 ms    [facility] QTC Calculation 393 ms    Narrative    Test Reason : Z00.00,    Vent. Rate :  49 BPM     Atrial Rate :  49 BPM     P-R Int : 216 ms          QRS Dur :  98 ms      QT Int : 436 ms       P-R-T Axes :  34 -16  27 degrees    QTcB Int : 393 ms    Sinus bradycardia with sinus arrhythmia with 1st degree A-V block  Incomplete right bundle branch block  Possible Anterior infarct ,age undetermined  Abnormal ECG  When compared with ECG of 07-Aug-2024 09:47,  Borderline criteria for Anterior infarct are now Present    Referred By: LEI HERNANDEZ           Confirmed By:            3. Mixed hyperlipidemia  Assessment & Plan:  Lab Results   Component Value Date    CHOL 191 08/07/2024    CHOL 180 12/28/2023    CHOL 257 (H) 09/28/2023    TRIG 105 08/07/2024    TRIG 92 12/28/2023    TRIG 117 09/28/2023    HDL 37 (L) 08/07/2024    HDL 42 12/28/2023    HDL 45 09/28/2023    LDLCALC 133.0 08/07/2024    LDLCALC 119.6 12/28/2023    LDLCALC 188.6 (H) 09/28/2023    NONHDLCHOL 154 08/07/2024    NONHDLCHOL 138 12/28/2023    NONHDLCHOL 212 09/28/2023    AST 30 08/07/2024    ALT 50 (H) 08/07/2024     The 10-year ASCVD risk score (Saida SANDOVAL, et al., 2019) is: 8.3%    Values used to calculate the score:      Age: 45 years      Sex: Male      Is Non- : Yes      Diabetic: No      Tobacco smoker: No      Systolic Blood Pressure: 137 mmHg      Is BP treated: Yes      HDL Cholesterol: 37 mg/dL      Total Cholesterol: 191 mg/dL           PHYSICAL EXAM  Vitals:    08/05/25 0911   BP: 137/89   BP Location: Right arm   Patient Position: Sitting   Pulse: (!) 51   Resp: 18   Temp: 97 °F (36.1 °C)   TempSrc: Tympanic  "  SpO2: 99%   Weight: 111.6 kg (246 lb)   Height: 6' 2" (1.88 m)   Physical Exam    MEDICATIONS  Foster has a current medication list which includes the following prescription(s): atorvastatin, cinacalcet, eletriptan, olmesartan, pantoprazole, tadalafil, and ergocalciferol.    HEALTH MAINTENANCE AND SCREENINGS - UP TO DATE  Health Maintenance Topics with due status: Not Due       Topic Last Completion Date    TETANUS VACCINE 10/29/2021    Influenza Vaccine 09/28/2023    Hemoglobin A1c (Diabetic Prevention Screening) 08/07/2024    Lipid Panel 08/07/2024    Colorectal Cancer Screening 03/14/2025    RSV Vaccine (Age 60+ and Pregnant patients) Not Due     HEALTH MAINTENANCE AND SCREENINGS - DUE OR DUE SOON  Health Maintenance Due   Topic Date Due    COVID-19 Vaccine (4 - 2024-25 season) 09/01/2024     FOLLOW-UP  Foster is to follow up with *** for any health problems or concerns, any abnormal test results, and any age-appropriate health maintenance interventions and screenings that may be due.    PROBLEM LIST  Foster has Migraine with aura and without status migrainosus, not intractable; Primary hypertension; Mixed hyperlipidemia; and Primary hyperparathyroidism on their problem list.    PAST MEDICAL HISTORY  Foster has a past medical history of Migraine.    SURGICAL HISTORY  Foster has a past surgical history that includes Vasectomy; Elbow surgery (Right); xi robotic colectomy, partial (04/2025); and Fracture surgery.    FAMILY HISTORY  Foster family history includes Diabetes in his paternal grandmother; Heart disease in his father; Hypertension in his father and mother; No Known Problems in his daughter, son, and son.     ALLERGIES  Foster reports he has no known allergies.    SOCIAL HISTORY  Foster  reports that he has never smoked. He has never used smokeless tobacco. He reports current alcohol use of about 3.0 standard drinks of alcohol per week. He reports that he does not use drugs.     Documentation entered by me for " this encounter may have been done in part using ambient-listening and speech-recognition technologies. I have reviewed the content for accuracy, though errors in syntax, spelling, or similar-sounding words may be present and should be interpreted in context. Please contact the author for any clarification.    Appearance: Normal appearance. He is not ill-appearing, toxic-appearing or diaphoretic.   HENT:      Head: Normocephalic and atraumatic.      Right Ear: Tympanic membrane, ear canal and external ear normal.      Left Ear: Tympanic membrane, ear canal and external ear normal.   Eyes:      General: No scleral icterus.     Conjunctiva/sclera: Conjunctivae normal.   Neck:      Thyroid: No thyroid mass, thyromegaly or thyroid tenderness.      Vascular: No carotid bruit.   Cardiovascular:      Rate and Rhythm: Normal rate and regular rhythm.      Heart sounds: Normal heart sounds.   Pulmonary:      Effort: Pulmonary effort is normal.      Breath sounds: Normal breath sounds.   Abdominal:      General: Bowel sounds are normal. There is no distension.      Palpations: Abdomen is soft. There is no mass.      Tenderness: There is no abdominal tenderness.   Musculoskeletal:         General: No swelling, tenderness or deformity. Normal range of motion.      Cervical back: No muscular tenderness.   Lymphadenopathy:      Cervical: No cervical adenopathy.   Skin:     General: Skin is warm and dry.      Coloration: Skin is not jaundiced.   Neurological:      General: No focal deficit present.      Mental Status: He is alert and oriented to person, place, and time. Mental status is at baseline.      Cranial Nerves: No cranial nerve deficit.      Gait: Gait normal.   Psychiatric:         Mood and Affect: Mood normal.         Behavior: Behavior normal.         Judgment: Judgment normal.       MEDICATIONS  Foster has a current medication list which includes the following prescription(s): atorvastatin, cinacalcet, eletriptan, olmesartan, pantoprazole, tadalafil, and ergocalciferol.    HEALTH MAINTENANCE AND SCREENINGS - UP TO DATE  Health Maintenance Topics with due status: Not Due       Topic Last Completion Date    TETANUS VACCINE 10/29/2021    Influenza Vaccine 09/28/2023    Colorectal Cancer Screening 03/14/2025    Hemoglobin A1c  (Prediabetes) 08/05/2025    Lipid Panel 08/05/2025    RSV Vaccine (Age 60+ and Pregnant patients) Not Due     HEALTH MAINTENANCE AND SCREENINGS - DUE OR DUE SOON  Health Maintenance Due   Topic Date Due    COVID-19 Vaccine (4 - 2024-25 season) 09/01/2024     FOLLOW-UP  Foster is to follow up with his PCP for any health problems or concerns, any abnormal test results, and any age-appropriate health maintenance interventions and screenings that may be due.    PROBLEM LIST  Foster has Migraine with aura and without status migrainosus, not intractable; Primary hypertension; Mixed hyperlipidemia; Primary hyperparathyroidism; Bradycardia, physiologic; Benign ethnic neutropenia; Chronic pain of left knee; and Prediabetes on their problem list.    PAST MEDICAL HISTORY  Foster has a past medical history of Migraine.    SURGICAL HISTORY  Foster has a past surgical history that includes Vasectomy; Elbow surgery (Right); xi robotic colectomy, partial (04/2025); and Fracture surgery.    FAMILY HISTORY  Foster family history includes Diabetes in his paternal grandmother; Heart disease in his father; Hypertension in his father and mother; No Known Problems in his daughter, son, and son.     ALLERGIES  Foster reports he has no known allergies.    SOCIAL HISTORY  Foster  reports that he has never smoked. He has never used smokeless tobacco. He reports current alcohol use of about 3.0 standard drinks of alcohol per week. He reports that he does not use drugs.     Documentation entered by me for this encounter may have been done in part using ambient-listening and speech-recognition technologies. I have reviewed the content for accuracy, though errors in syntax, spelling, or similar-sounding words may be present and should be interpreted in context. Please contact the author for any clarification.

## 2025-08-16 PROBLEM — R73.03 PREDIABETES: Status: ACTIVE | Noted: 2025-08-16

## 2025-08-16 PROBLEM — R73.03 PREDIABETES: Chronic | Status: ACTIVE | Noted: 2025-08-16

## 2025-08-16 PROBLEM — Z67.A1 BENIGN ETHNIC NEUTROPENIA: Chronic | Status: ACTIVE | Noted: 2025-08-05

## 2025-08-16 NOTE — ASSESSMENT & PLAN NOTE
Lab Results   Component Value Date    HGBA1C 5.8 (H) 08/05/2025    HGBA1C 5.8 (H) 08/07/2024    HGBA1C 5.6 09/28/2023    HGBA1C 5.5 08/30/2022    HGBA1C 5.6 10/29/2021    GLU 95 08/05/2025     08/07/2024    GLU 94 12/28/2023    GLU 97 09/28/2023    GLU 98 08/30/2022